# Patient Record
Sex: MALE | Race: WHITE | NOT HISPANIC OR LATINO | Employment: OTHER | URBAN - METROPOLITAN AREA
[De-identification: names, ages, dates, MRNs, and addresses within clinical notes are randomized per-mention and may not be internally consistent; named-entity substitution may affect disease eponyms.]

---

## 2017-05-04 PROBLEM — I50.21 ACUTE SYSTOLIC CONGESTIVE HEART FAILURE: Status: ACTIVE | Noted: 2017-05-04

## 2017-05-15 PROBLEM — Z86.79 H/O CHF: Status: ACTIVE | Noted: 2017-05-15

## 2017-09-06 PROBLEM — I42.0 DILATED CARDIOMYOPATHY: Status: ACTIVE | Noted: 2017-09-06

## 2017-12-01 ENCOUNTER — INITIAL CONSULT (OUTPATIENT)
Dept: RHEUMATOLOGY | Facility: CLINIC | Age: 62
End: 2017-12-01
Payer: COMMERCIAL

## 2017-12-01 VITALS
HEIGHT: 72 IN | BODY MASS INDEX: 38.31 KG/M2 | WEIGHT: 282.81 LBS | DIASTOLIC BLOOD PRESSURE: 92 MMHG | SYSTOLIC BLOOD PRESSURE: 165 MMHG | HEART RATE: 84 BPM

## 2017-12-01 DIAGNOSIS — R53.81 MALAISE AND FATIGUE: ICD-10-CM

## 2017-12-01 DIAGNOSIS — R53.83 MALAISE AND FATIGUE: ICD-10-CM

## 2017-12-01 DIAGNOSIS — E11.29 TYPE 2 DIABETES MELLITUS WITH MICROALBUMINURIA, UNSPECIFIED LONG TERM INSULIN USE STATUS: ICD-10-CM

## 2017-12-01 DIAGNOSIS — Z85.528 HX OF RENAL CELL CARCINOMA: ICD-10-CM

## 2017-12-01 DIAGNOSIS — R80.9 TYPE 2 DIABETES MELLITUS WITH MICROALBUMINURIA, UNSPECIFIED LONG TERM INSULIN USE STATUS: ICD-10-CM

## 2017-12-01 DIAGNOSIS — M05.79 RHEUMATOID ARTHRITIS INVOLVING MULTIPLE SITES WITH POSITIVE RHEUMATOID FACTOR: Primary | ICD-10-CM

## 2017-12-01 PROCEDURE — 99999 PR PBB SHADOW E&M-EST. PATIENT-LVL III: CPT | Mod: PBBFAC,,, | Performed by: INTERNAL MEDICINE

## 2017-12-01 PROCEDURE — 99205 OFFICE O/P NEW HI 60 MIN: CPT | Mod: S$GLB,,, | Performed by: INTERNAL MEDICINE

## 2017-12-01 RX ORDER — HYDROXYCHLOROQUINE SULFATE 200 MG/1
200 TABLET, FILM COATED ORAL 2 TIMES DAILY
Qty: 60 TABLET | Refills: 11 | Status: SHIPPED | OUTPATIENT
Start: 2017-12-01 | End: 2017-12-21

## 2017-12-01 RX ORDER — SPIRONOLACTONE 25 MG/1
25 TABLET ORAL DAILY
COMMUNITY
End: 2017-12-05 | Stop reason: SDUPTHER

## 2017-12-01 RX ORDER — CARVEDILOL 3.12 MG/1
3.12 TABLET ORAL 2 TIMES DAILY WITH MEALS
COMMUNITY
End: 2018-01-08

## 2017-12-01 ASSESSMENT — ROUTINE ASSESSMENT OF PATIENT INDEX DATA (RAPID3)
PSYCHOLOGICAL DISTRESS SCORE: 6.6
PATIENT GLOBAL ASSESSMENT SCORE: 5
MDHAQ FUNCTION SCORE: 1.7
TOTAL RAPID3 SCORE: 6.55
PAIN SCORE: 9

## 2017-12-01 NOTE — PROGRESS NOTES
Subjective:          Chief Complaint: Jorge Mckeon is a 62 y.o. male who had concerns including Rheumatoid Arthritis (self referral).    HPI:    She is a 62-year-old gentleman with seropositive rheumatoid arthritis size bruising on methotrexate I has a history of renal cell carcinoma was elected to start Biologics last rheumatology visit that I can see within the records was in January 2017- non-[facility].  +Rheum nodules.   By record review today patient is on methotrexate 9 tablets weekly with leucovorin 5 mg weekly.  Prednisone 5mg daily did not take b/c concern over BS. Raises blood sugar.   Previously: Enbrel, Orencia, Remicade, Actemra, Simponi, Cimzia and best overall was Humira with longest success. never RTX.   Never LFA  No other malignancies.   Patient follows with Dr. Chandra for pain management.       REVIEW OF SYSTEMS:    Review of Systems   Constitutional: Negative for fever, malaise/fatigue and weight loss.   HENT: Negative for sore throat.    Eyes: Negative for double vision, photophobia and redness.   Respiratory: Negative for cough, shortness of breath and wheezing.    Cardiovascular: Negative for chest pain, palpitations and orthopnea.   Gastrointestinal: Negative for abdominal pain, constipation and diarrhea.   Genitourinary: Negative for dysuria, hematuria and urgency.   Musculoskeletal: Positive for joint pain. Negative for back pain and myalgias.   Skin: Negative for rash.   Neurological: Negative for dizziness, tingling, focal weakness and headaches.   Endo/Heme/Allergies: Does not bruise/bleed easily.   Psychiatric/Behavioral: Negative for depression, hallucinations and suicidal ideas.               Objective:            Past Medical History:   Diagnosis Date    Anticoagulant long-term use     Cancer     renal CA    Coronary artery disease     Diabetes     IDDM    GERD (gastroesophageal reflux disease)     H/O CHF     Hyperlipidemia     stopped cholesterol meds currently    Hypertension      Immunosuppression     Methotrexate for 2 years for RA    Rheumatoid arthritis      Family History   Problem Relation Age of Onset    Cancer Mother     Diabetes Mother     Heart disease Father      Social History   Substance Use Topics    Smoking status: Current Every Day Smoker     Packs/day: 0.50     Years: 40.00     Types: Cigarettes    Smokeless tobacco: Never Used    Alcohol use No         Current Outpatient Prescriptions on File Prior to Visit   Medication Sig Dispense Refill    aspirin (ECOTRIN) 81 MG EC tablet Take 81 mg by mouth once daily.      clopidogrel (PLAVIX) 75 mg tablet Take 1 tablet (75 mg total) by mouth once daily. 90 tablet 3    esomeprazole (NEXIUM) 40 MG capsule TAKE 1 CAPSULE BY MOUTH IN THE MORNING 30 capsule 3    furosemide (LASIX) 20 MG tablet Take 1 tablet (20 mg total) by mouth once daily. 90 tablet 3    insulin aspart protamine-insulin aspart (NOVOLOG MIX 70-30 FLEXPEN) 100 unit/mL (70-30) InPn pen Inject 72 Units into the skin 2 (two) times daily. Needs appt (Patient taking differently: Inject 70 Units into the skin 2 (two) times daily. Needs appt) 45 mL 0    JANUMET 50-1,000 mg per tablet Take 1 tablet by mouth 2 (two) times daily with meals. 60 tablet 3    leucovorin (WELLCOVORIN) 5 mg Tab Take 5 mg by mouth every 7 days.      methotrexate 2.5 MG Tab Take 22.5 mg by mouth Every Friday.       oxycodone-acetaminophen (PERCOCET)  mg per tablet Take 1 tablet by mouth every 6 (six) hours as needed for Pain. 30 tablet 0    pravastatin (PRAVACHOL) 40 MG tablet Take 1 tablet (40 mg total) by mouth once daily. 90 tablet 3    tramadol (ULTRAM-ER) 300 MG Tb24 Take 300 mg by mouth once daily.      valsartan (DIOVAN) 320 MG tablet TAKE 1 TABLET BY MOUTH EVERY DAY 30 tablet 3    hydroxychloroquine (PLAQUENIL) 200 mg tablet Take 200 mg by mouth once daily.  0    ME-TETRAHYDROFOLATE/B12/DXF296 (RHEUMATE ORAL) Take 1 mg by mouth once daily.      VIMOVO 500-20 mg TbID  Take 1 tablet by mouth 2 (two) times daily before meals.  3     No current facility-administered medications on file prior to visit.        Vitals:    12/01/17 1305   BP: (!) 165/92   Pulse: 84       Physical Exam:    Physical Exam   Constitutional: He is oriented to person, place, and time. He appears well-developed and well-nourished.   HENT:   Head: Normocephalic.   Right Ear: Hearing normal.   Left Ear: Hearing normal.   Nose: No rhinorrhea.   Mouth/Throat: Uvula is midline, oropharynx is clear and moist and mucous membranes are normal.   Eyes: Conjunctivae and EOM are normal. Pupils are equal, round, and reactive to light.   Cardiovascular: Normal rate, regular rhythm and normal heart sounds.    Pulmonary/Chest: Effort normal and breath sounds normal.   Musculoskeletal:        Right shoulder: He exhibits normal range of motion, no tenderness and no swelling.        Left shoulder: He exhibits normal range of motion, no tenderness and no swelling.        Right wrist: He exhibits normal range of motion, no tenderness and no swelling.        Left wrist: He exhibits normal range of motion, no tenderness and no swelling.        Right knee: He exhibits normal range of motion and no swelling. No tenderness found.        Left knee: He exhibits normal range of motion and no swelling. No tenderness found.        Right ankle: He exhibits normal range of motion and no swelling. No tenderness.        Left ankle: He exhibits normal range of motion and no swelling. No tenderness.        Right hand: He exhibits tenderness and swelling. He exhibits normal range of motion.        Left hand: He exhibits decreased range of motion and tenderness. He exhibits no swelling.        Right foot: There is normal range of motion, no tenderness and no swelling.        Left foot: There is normal range of motion, no tenderness and no swelling.   ruight rheum nodule on elbow. No flexion contracture. Right 2,3 MCP with swelling.    Neurological:  He is oriented to person, place, and time. He has normal strength.   Skin: Skin is warm, dry and intact.   Psychiatric: He has a normal mood and affect. His speech is normal and behavior is normal. Cognition and memory are normal.             Assessment:       Encounter Diagnoses   Name Primary?    Rheumatoid arthritis involving multiple sites with positive rheumatoid factor Yes    Type 2 diabetes mellitus with microalbuminuria, unspecified long term insulin use status     Hx of renal cell carcinoma     Malaise and fatigue           Plan:        Rheumatoid arthritis involving multiple sites with positive rheumatoid factor  -     X-Ray Hand 3 View Bilateral; Future; Expected date: 12/01/2017    Type 2 diabetes mellitus with microalbuminuria, unspecified long term insulin use status    Hx of renal cell carcinoma  Comments:  left s/p partial     Malaise and fatigue  -     Hepatitis B surface antigen; Future; Expected date: 12/01/2017  -     HBcAB; Future; Expected date: 12/01/2017  -     Hepatitis B surface antibody; Future; Expected date: 12/01/2017  -     Hepatitis C antibody; Future; Expected date: 12/01/2017  -     Quantiferon Gold TB; Future; Expected date: 12/01/2017    Other orders  -     hydroxychloroquine (PLAQUENIL) 200 mg tablet; Take 1 tablet (200 mg total) by mouth 2 (two) times daily.  Dispense: 60 tablet; Refill: 11    Call if HCQ not working  Labs xrays ok at [facility] wife employee.     Return in about 4 months (around 4/1/2018).        60min consultation with greater than 50% spent in counseling, chart review and coordination of care. All questions answered.    Thank you for allowing me to participate in the care of this very pleasant patient.

## 2017-12-08 ENCOUNTER — TELEPHONE (OUTPATIENT)
Dept: RHEUMATOLOGY | Facility: CLINIC | Age: 62
End: 2017-12-08

## 2017-12-08 NOTE — TELEPHONE ENCOUNTER
----- Message from Yamel Bautista DO sent at 12/7/2017  6:04 PM CST -----  Please notify patient that all testing is negative. I have his records that he is still taking Methotrexate please clarify this. My note only refilled HCQ.    LM as above. Call back number is provided for patient to clarify. CG    Wife calls to clarify. Patient has been on 9 tablets of MTX every week for years. It was prescribed by previous rheumatologist and the patient has continued. SIMI

## 2017-12-11 RX ORDER — METHOTREXATE 2.5 MG/1
22.5 TABLET ORAL
Qty: 36 TABLET | Refills: 4 | Status: SHIPPED | OUTPATIENT
Start: 2017-12-11 | End: 2018-04-26 | Stop reason: SDUPTHER

## 2017-12-11 NOTE — TELEPHONE ENCOUNTER
Labs look good I am refilling methotrexate at 9 tabs weekly so this will be listed under my name as well as the HCQ.     Thanks.    Dr. DE JESUS

## 2017-12-12 NOTE — TELEPHONE ENCOUNTER
Patient notified that labs look good and that both MTX and HCQ are now under Dr. Bautista's name. CG

## 2018-04-16 ENCOUNTER — TELEPHONE (OUTPATIENT)
Dept: RHEUMATOLOGY | Facility: CLINIC | Age: 63
End: 2018-04-16

## 2018-04-16 DIAGNOSIS — M05.79 RHEUMATOID ARTHRITIS INVOLVING MULTIPLE SITES WITH POSITIVE RHEUMATOID FACTOR: Primary | ICD-10-CM

## 2018-04-26 ENCOUNTER — OFFICE VISIT (OUTPATIENT)
Dept: RHEUMATOLOGY | Facility: CLINIC | Age: 63
End: 2018-04-26
Payer: COMMERCIAL

## 2018-04-26 VITALS
SYSTOLIC BLOOD PRESSURE: 160 MMHG | BODY MASS INDEX: 37.76 KG/M2 | HEART RATE: 64 BPM | DIASTOLIC BLOOD PRESSURE: 90 MMHG | WEIGHT: 284.88 LBS | HEIGHT: 73 IN

## 2018-04-26 DIAGNOSIS — N28.89 RENAL MASS: ICD-10-CM

## 2018-04-26 DIAGNOSIS — R80.9 TYPE 2 DIABETES MELLITUS WITH MICROALBUMINURIA, UNSPECIFIED WHETHER LONG TERM INSULIN USE: ICD-10-CM

## 2018-04-26 DIAGNOSIS — E11.29 TYPE 2 DIABETES MELLITUS WITH MICROALBUMINURIA, UNSPECIFIED WHETHER LONG TERM INSULIN USE: ICD-10-CM

## 2018-04-26 DIAGNOSIS — M05.79 RHEUMATOID ARTHRITIS INVOLVING MULTIPLE SITES WITH POSITIVE RHEUMATOID FACTOR: Primary | ICD-10-CM

## 2018-04-26 PROCEDURE — 99214 OFFICE O/P EST MOD 30 MIN: CPT | Mod: S$GLB,,, | Performed by: INTERNAL MEDICINE

## 2018-04-26 PROCEDURE — 99999 PR PBB SHADOW E&M-EST. PATIENT-LVL III: CPT | Mod: PBBFAC,,, | Performed by: INTERNAL MEDICINE

## 2018-04-26 RX ORDER — HYDROXYCHLOROQUINE SULFATE 200 MG/1
200 TABLET, FILM COATED ORAL DAILY
Qty: 90 TABLET | Refills: 3 | Status: SHIPPED | OUTPATIENT
Start: 2018-04-26 | End: 2018-08-07 | Stop reason: SDUPTHER

## 2018-04-26 RX ORDER — FOLIC ACID 1 MG/1
1 TABLET ORAL DAILY
Qty: 90 TABLET | Refills: 3 | Status: SHIPPED | OUTPATIENT
Start: 2018-04-26 | End: 2018-08-07 | Stop reason: SDUPTHER

## 2018-04-26 RX ORDER — METHOTREXATE 2.5 MG/1
22.5 TABLET ORAL
Qty: 108 TABLET | Refills: 3 | Status: SHIPPED | OUTPATIENT
Start: 2018-04-27 | End: 2018-08-07 | Stop reason: SDUPTHER

## 2018-04-26 RX ORDER — FOLIC ACID 1 MG/1
1 TABLET ORAL DAILY
COMMUNITY
End: 2018-04-26 | Stop reason: SDUPTHER

## 2018-04-26 NOTE — PROGRESS NOTES
Subjective:          Chief Complaint: Jorge Mckeon is a 63 y.o. male who had concerns including Disease Management.    HPI:    She is a 62-year-old gentleman with seropositive rheumatoid arthritis size bruising on methotrexate    has a history of renal cell carcinoma was elected to start Biologics last rheumatology visit that I can see within the records was in January 2017- non-[facility].  +Rheum nodules.   By record review today patient is on methotrexate 9 tablets weekly with leucovorin 5 mg weekly. And HCQ 200mg   Prednisone 5mg daily did not take b/c concern over BS. Raises blood sugar.   Previously: Enbrel, Orencia, Remicade, Actemra, Simponi, Cimzia and best overall was Humira with longest success. never RTX.   Never LFA  No other malignancies.   Patient follows with Dr. Chandra for pain management.   Last exam with Dr. Nasrin Ashley follows annually.     REVIEW OF SYSTEMS:    Review of Systems   Constitutional: Negative for fever, malaise/fatigue and weight loss.   HENT: Negative for sore throat.    Eyes: Negative for double vision, photophobia and redness.   Respiratory: Negative for cough, shortness of breath and wheezing.    Cardiovascular: Negative for chest pain, palpitations and orthopnea.   Gastrointestinal: Negative for abdominal pain, constipation and diarrhea.   Genitourinary: Negative for dysuria, hematuria and urgency.   Musculoskeletal: Positive for joint pain. Negative for back pain and myalgias.   Skin: Negative for rash.   Neurological: Negative for dizziness, tingling, focal weakness and headaches.   Endo/Heme/Allergies: Does not bruise/bleed easily.   Psychiatric/Behavioral: Negative for depression, hallucinations and suicidal ideas.               Objective:            Past Medical History:   Diagnosis Date    Anticoagulant long-term use     Cancer     renal CA    Coronary artery disease     Diabetes     IDDM    GERD (gastroesophageal reflux disease)     H/O CHF     Hyperlipidemia      stopped cholesterol meds currently    Hypertension     Immunosuppression     Methotrexate for 2 years for RA    Rheumatoid arthritis      Family History   Problem Relation Age of Onset    Cancer Mother     Diabetes Mother     Heart disease Father      Social History   Substance Use Topics    Smoking status: Current Every Day Smoker     Packs/day: 0.50     Years: 40.00     Types: Cigarettes    Smokeless tobacco: Never Used    Alcohol use No         Current Outpatient Prescriptions on File Prior to Visit   Medication Sig Dispense Refill    aspirin (ECOTRIN) 81 MG EC tablet Take 81 mg by mouth once daily.      carvedilol (COREG) 6.25 MG tablet Take 6.25 mg by mouth 2 (two) times daily with meals.      clopidogrel (PLAVIX) 75 mg tablet Take 1 tablet (75 mg total) by mouth once daily. 90 tablet 3    DULoxetine (CYMBALTA) 30 MG capsule Take 1 capsule (30 mg total) by mouth once daily. 30 capsule 3    esomeprazole (NEXIUM) 40 MG capsule TAKE 1 CAPSULE BY MOUTH IN THE MORNING 30 capsule 2    furosemide (LASIX) 20 MG tablet Take 1 tablet (20 mg total) by mouth once daily. 90 tablet 3    hydroxychloroquine (PLAQUENIL) 200 mg tablet Take 200 mg by mouth once daily.      JANUMET 50-1,000 mg per tablet Take 1 tablet by mouth 2 (two) times daily with meals. 60 tablet 3    leucovorin (WELLCOVORIN) 5 mg Tab Take 5 mg by mouth every 7 days.      methotrexate 2.5 MG Tab Take 9 tablets (22.5 mg total) by mouth Every Friday. 36 tablet 4    NOVOLOG MIX 70-30 FLEXPEN 100 unit/mL (70-30) InPn pen inject 70 UNITS SUBCUTANEOUSLY TWICE DAILY 45 mL 3    pravastatin (PRAVACHOL) 40 MG tablet Take 1 tablet (40 mg total) by mouth once daily. 90 tablet 3    spironolactone (ALDACTONE) 25 MG tablet Take 1 tablet (25 mg total) by mouth once daily. 90 tablet 3    tramadol (ULTRAM-ER) 300 MG Tb24 Take 300 mg by mouth once daily.      valsartan (DIOVAN) 320 MG tablet TAKE 1 TABLET BY MOUTH EVERY DAY 30 tablet 2    VIMOVO 500-20  mg TbID Take 1 tablet by mouth 2 (two) times daily before meals.  3     No current facility-administered medications on file prior to visit.        Vitals:    04/26/18 1109   BP: (!) 160/90   Pulse: 64       Physical Exam:    Physical Exam   Constitutional: He is oriented to person, place, and time. He appears well-developed and well-nourished.   HENT:   Head: Normocephalic.   Right Ear: Hearing normal.   Left Ear: Hearing normal.   Nose: No rhinorrhea.   Mouth/Throat: Uvula is midline, oropharynx is clear and moist and mucous membranes are normal.   Eyes: Conjunctivae and EOM are normal. Pupils are equal, round, and reactive to light.   Cardiovascular: Normal rate, regular rhythm and normal heart sounds.    Pulmonary/Chest: Effort normal and breath sounds normal.   Musculoskeletal:        Right shoulder: He exhibits normal range of motion, no tenderness and no swelling.        Left shoulder: He exhibits normal range of motion, no tenderness and no swelling.        Right wrist: He exhibits normal range of motion, no tenderness and no swelling.        Left wrist: He exhibits normal range of motion, no tenderness and no swelling.        Right knee: He exhibits normal range of motion and no swelling. No tenderness found.        Left knee: He exhibits normal range of motion and no swelling. No tenderness found.        Right ankle: He exhibits normal range of motion and no swelling. No tenderness.        Left ankle: He exhibits normal range of motion and no swelling. No tenderness.        Right hand: He exhibits tenderness and swelling. He exhibits normal range of motion.        Left hand: He exhibits decreased range of motion and tenderness. He exhibits no swelling.        Right foot: There is normal range of motion, no tenderness and no swelling.        Left foot: There is normal range of motion, no tenderness and no swelling.   ruight rheum nodule on elbow. No flexion contracture. Right 2,3 MCP with swelling.     Neurological: He is oriented to person, place, and time. He has normal strength.   Skin: Skin is warm, dry and intact.   Psychiatric: He has a normal mood and affect. His speech is normal and behavior is normal. Cognition and memory are normal.             Assessment:       Encounter Diagnoses   Name Primary?    Rheumatoid arthritis involving multiple sites with positive rheumatoid factor Yes    Renal mass     Type 2 diabetes mellitus with microalbuminuria, unspecified whether long term insulin use           Plan:        Rheumatoid arthritis involving multiple sites with positive rheumatoid factor  -     CBC auto differential; Standing; Expected date: 04/26/2018  -     Comprehensive metabolic panel; Standing; Expected date: 04/26/2018  -     Sedimentation rate, manual; Standing; Expected date: 04/26/2018  -     C-reactive protein; Standing; Expected date: 04/26/2018    Renal mass    Type 2 diabetes mellitus with microalbuminuria, unspecified whether long term insulin use    Other orders  -     folic acid (FOLVITE) 1 MG tablet; Take 1 tablet (1 mg total) by mouth once daily.  Dispense: 90 tablet; Refill: 3  -     hydroxychloroquine (PLAQUENIL) 200 mg tablet; Take 1 tablet (200 mg total) by mouth once daily.  Dispense: 90 tablet; Refill: 3  -     methotrexate 2.5 MG Tab; Take 9 tablets (22.5 mg total) by mouth Every Friday.  Dispense: 108 tablet; Refill: 3    Call if HCQ not working  Labs xrays ok at [facility] wife employee.     Follow-up in about 4 months (around 8/26/2018).        30min consultation with greater than 50% spent in counseling, chart review and coordination of care. All questions answered.    Thank you for allowing me to participate in the care of this very pleasant patient.

## 2018-05-24 PROBLEM — K21.9 GERD (GASTROESOPHAGEAL REFLUX DISEASE): Status: ACTIVE | Noted: 2018-05-24

## 2018-05-24 RX ORDER — METHOTREXATE 2.5 MG/1
TABLET ORAL
Qty: 36 TABLET | Refills: 3 | Status: SHIPPED | OUTPATIENT
Start: 2018-05-24 | End: 2018-05-31 | Stop reason: SDUPTHER

## 2018-05-31 PROBLEM — F32.1 CURRENT MODERATE EPISODE OF MAJOR DEPRESSIVE DISORDER WITHOUT PRIOR EPISODE: Status: ACTIVE | Noted: 2018-05-31

## 2018-06-15 PROBLEM — Z86.010 PERSONAL HISTORY OF COLONIC POLYPS: Status: ACTIVE | Noted: 2018-06-15

## 2018-06-15 PROBLEM — Z86.0100 PERSONAL HISTORY OF COLONIC POLYPS: Status: ACTIVE | Noted: 2018-06-15

## 2018-08-07 RX ORDER — METHOTREXATE 2.5 MG/1
22.5 TABLET ORAL
Qty: 108 TABLET | Refills: 1 | Status: SHIPPED | OUTPATIENT
Start: 2018-08-10 | End: 2018-08-07 | Stop reason: SDUPTHER

## 2018-08-07 RX ORDER — FOLIC ACID 1 MG/1
1 TABLET ORAL DAILY
Qty: 90 TABLET | Refills: 3 | Status: SHIPPED | OUTPATIENT
Start: 2018-08-07 | End: 2018-11-09 | Stop reason: SDUPTHER

## 2018-08-07 NOTE — TELEPHONE ENCOUNTER
----- Message from Daiana Ko sent at 8/7/2018  9:15 AM CDT -----  Contact: self  Type:  RX Refill Request    Who Called:  self  Refill or New Rx:  refill  RX Name and Strength:  folic acid (FOLVITE) 1 MG tablet; methotrexate 2.5 MG Tab  How is the patient currently taking it? (ex. 1XDay):  1Xday  Is this a 30 day or 90 day RX:  90  Preferred Pharmacy with phone number:  [facility]  Local or Mail Order:  local  Ordering Provider:  Dr Michele Anthony Call Back Number:  [phone]  Additional Information:      [facility] - - [address] [address]  Phone: [phone] Fax: [phone]

## 2018-09-11 ENCOUNTER — OFFICE VISIT (OUTPATIENT)
Dept: RHEUMATOLOGY | Facility: CLINIC | Age: 63
End: 2018-09-11
Payer: COMMERCIAL

## 2018-09-11 VITALS
HEART RATE: 75 BPM | HEIGHT: 73 IN | SYSTOLIC BLOOD PRESSURE: 152 MMHG | DIASTOLIC BLOOD PRESSURE: 84 MMHG | WEIGHT: 274.94 LBS | BODY MASS INDEX: 36.44 KG/M2

## 2018-09-11 DIAGNOSIS — M05.79 RHEUMATOID ARTHRITIS INVOLVING MULTIPLE SITES WITH POSITIVE RHEUMATOID FACTOR: Primary | ICD-10-CM

## 2018-09-11 DIAGNOSIS — Z79.899 HIGH RISK MEDICATIONS (NOT ANTICOAGULANTS) LONG-TERM USE: ICD-10-CM

## 2018-09-11 PROCEDURE — 99214 OFFICE O/P EST MOD 30 MIN: CPT | Mod: S$GLB,,, | Performed by: INTERNAL MEDICINE

## 2018-09-11 PROCEDURE — 99213 OFFICE O/P EST LOW 20 MIN: CPT | Mod: PBBFAC,PO | Performed by: INTERNAL MEDICINE

## 2018-09-11 PROCEDURE — 99999 PR PBB SHADOW E&M-EST. PATIENT-LVL III: CPT | Mod: PBBFAC,,, | Performed by: INTERNAL MEDICINE

## 2018-09-11 RX ORDER — DULOXETIN HYDROCHLORIDE 30 MG/1
30 CAPSULE, DELAYED RELEASE ORAL DAILY
Refills: 0 | COMMUNITY
Start: 2018-08-14 | End: 2018-11-13 | Stop reason: SDUPTHER

## 2018-09-11 ASSESSMENT — ROUTINE ASSESSMENT OF PATIENT INDEX DATA (RAPID3)
PAIN SCORE: 0
TOTAL RAPID3 SCORE: 1.22
WHEN YOU AWAKENED IN THE MORNING OVER THE LAST WEEK, PLEASE INDICATE THE AMOUNT OF TIME IT TAKES UNTIL YOU ARE AS LIMBER AS YOU WILL BE FOR THE DAY: 2 HOURS
MDHAQ FUNCTION SCORE: .2
PATIENT GLOBAL ASSESSMENT SCORE: 3
AM STIFFNESS SCORE: 1, YES

## 2018-09-11 NOTE — PROGRESS NOTES
Subjective:          Chief Complaint: Jorge Mckeon is a 63 y.o. male who had concerns including Rheumatoid Arthritis (4 month).    HPI:    She is a 62-year-old gentleman with seropositive rheumatoid arthritis size bruising on methotrexate    has a history of renal cell carcinoma was elected to start [facility] last rheumatology visit that I can see within the records was in January 2017- non-Patricksner.  +Rheum nodules.   He is currently on methotrexate 9 tablets weekly with leucovorin 5 mg weekly.   And HCQ 200mg   Prednisone 5mg daily did not take b/c concern over BS. Raises blood sugar.   Previously: Enbrel, Orencia, Remicade, Actemra, Simponi, Cimzia and best overall was Humira with longest success. never RTX.   Never LFA  No other malignancies.   Patient follows with Dr. Chandra for pain management.   Doing well pain 0/10 see rapid 3  Due for labs.     REVIEW OF SYSTEMS:    Review of Systems   Constitutional: Negative for fever, malaise/fatigue and weight loss.   HENT: Negative for sore throat.    Eyes: Negative for double vision, photophobia and redness.   Respiratory: Negative for cough, shortness of breath and wheezing.    Cardiovascular: Negative for chest pain, palpitations and orthopnea.   Gastrointestinal: Negative for abdominal pain, constipation and diarrhea.   Genitourinary: Negative for dysuria, hematuria and urgency.   Musculoskeletal: Positive for joint pain. Negative for back pain and myalgias.   Skin: Negative for rash.   Neurological: Negative for dizziness, tingling, focal weakness and headaches.   Endo/Heme/Allergies: Does not bruise/bleed easily.   Psychiatric/Behavioral: Negative for depression, hallucinations and suicidal ideas.               Objective:            Past Medical History:   Diagnosis Date    Anticoagulant long-term use     Cancer     renal CA    Coronary artery disease     Diabetes     IDDM    GERD (gastroesophageal reflux disease)     H/O CHF     Hyperlipidemia     stopped  cholesterol meds currently    Hypertension     Immunosuppression     Methotrexate for 2 years for RA    Rheumatoid arthritis      Family History   Problem Relation Age of Onset    Cancer Mother     Diabetes Mother     Heart disease Father      Social History     Tobacco Use    Smoking status: Current Every Day Smoker     Packs/day: 0.50     Years: 40.00     Pack years: 20.00     Types: Cigarettes    Smokeless tobacco: Never Used   Substance Use Topics    Alcohol use: No    Drug use: No         Current Outpatient Medications on File Prior to Visit   Medication Sig Dispense Refill    aspirin (ECOTRIN) 81 MG EC tablet Take 81 mg by mouth once daily.      carvedilol (COREG) 6.25 MG tablet Take 1 tablet (6.25 mg total) by mouth 2 (two) times daily with meals. 180 tablet 0    esomeprazole (NEXIUM) 40 MG capsule Take 1 capsule (40 mg total) by mouth every morning. 90 capsule 0    folic acid (FOLVITE) 1 MG tablet Take 1 tablet (1 mg total) by mouth once daily. 90 tablet 3    furosemide (LASIX) 20 MG tablet Take 1 tablet (20 mg total) by mouth once daily. for 360 doses 90 tablet 1    hydroxychloroquine (PLAQUENIL) 200 mg tablet Take 1 tablet (200 mg total) by mouth once daily. 90 tablet 1    insulin aspart protamine-insulin aspart (NOVOLOG MIX 70-30FLEXPEN U-100) 100 unit/mL (70-30) InPn pen Inject 70 Units into the skin 2 (two) times daily. 45 mL 3    losartan (COZAAR) 100 MG tablet Take 1 tablet (100 mg total) by mouth once daily. 90 tablet 0    methotrexate 2.5 MG Tab Take 9 tablets (22.5 mg total) by mouth Every Friday. 108 tablet 1    oxyCODONE-acetaminophen (PERCOCET)  mg per tablet Take 1 tablet by mouth 2 (two) times daily.  0    pravastatin (PRAVACHOL) 40 MG tablet Take 1 tablet (40 mg total) by mouth once daily. 90 tablet 3    SITagliptan-metformin (JANUMET) 50-1,000 mg per tablet Take 1 tablet by mouth 2 (two) times daily with meals. 60 tablet 3    spironolactone (ALDACTONE) 25 MG  tablet Take 1 tablet (25 mg total) by mouth once daily. 90 tablet 0    tramadol (ULTRAM-ER) 300 MG Tb24 Take 300 mg by mouth once daily.      DULoxetine (CYMBALTA) 30 MG capsule Take 30 mg by mouth once daily.  0     No current facility-administered medications on file prior to visit.        Vitals:    09/11/18 0958   BP: (!) 152/84   Pulse: 75       Physical Exam:    Physical Exam   Constitutional: He is oriented to person, place, and time. He appears well-developed and well-nourished.   HENT:   Head: Normocephalic.   Right Ear: Hearing normal.   Left Ear: Hearing normal.   Nose: No rhinorrhea.   Mouth/Throat: Uvula is midline, oropharynx is clear and moist and mucous membranes are normal.   Eyes: Conjunctivae and EOM are normal. Pupils are equal, round, and reactive to light.   Cardiovascular: Normal rate, regular rhythm and normal heart sounds.   Pulmonary/Chest: Effort normal and breath sounds normal.   Musculoskeletal:        Right shoulder: He exhibits normal range of motion, no tenderness and no swelling.        Left shoulder: He exhibits normal range of motion, no tenderness and no swelling.        Right wrist: He exhibits normal range of motion, no tenderness and no swelling.        Left wrist: He exhibits normal range of motion, no tenderness and no swelling.        Right knee: He exhibits normal range of motion and no swelling. No tenderness found.        Left knee: He exhibits normal range of motion and no swelling. No tenderness found.        Right ankle: He exhibits normal range of motion and no swelling. No tenderness.        Left ankle: He exhibits normal range of motion and no swelling. No tenderness.        Right hand: He exhibits tenderness and swelling. He exhibits normal range of motion.        Left hand: He exhibits decreased range of motion and tenderness. He exhibits no swelling.        Right foot: There is normal range of motion, no tenderness and no swelling.        Left foot: There is  normal range of motion, no tenderness and no swelling.   ruight rheum nodule on elbow. No flexion contracture. Right 2,3 MCP with swelling.    Neurological: He is oriented to person, place, and time. He has normal strength.   Skin: Skin is warm, dry and intact.   Psychiatric: He has a normal mood and affect. His speech is normal and behavior is normal. Cognition and memory are normal.             Assessment:       Encounter Diagnoses   Name Primary?    Rheumatoid arthritis involving multiple sites with positive rheumatoid factor Yes    High risk medications (not anticoagulants) long-term use           Plan:        Rheumatoid arthritis involving multiple sites with positive rheumatoid factor  -     Comprehensive metabolic panel; Standing; Expected date: 09/11/2018  -     CBC auto differential; Standing; Expected date: 09/11/2018  -     Sedimentation rate; Standing; Expected date: 09/11/2018  -     C-reactive protein; Standing; Expected date: 09/11/2018    High risk medications (not anticoagulants) long-term use      Continue MTX at 9 tabs weekly  Leucovorin 5mg daily  HCQ 200mg once daily  Labs at [place].   No Follow-up on file.        30min consultation with greater than 50% spent in counseling, chart review and coordination of care. All questions answered.    Thank you for allowing me to participate in the care of this very pleasant patient.

## 2018-11-27 DIAGNOSIS — M05.79 RHEUMATOID ARTHRITIS INVOLVING MULTIPLE SITES WITH POSITIVE RHEUMATOID FACTOR: ICD-10-CM

## 2018-11-27 NOTE — TELEPHONE ENCOUNTER
----- Message from Latrice Zapata sent at 11/27/2018  3:24 PM CST -----  Contact: Julio brian-[phone]  Type: Needs Medical Advice    Who Called:  Julio brian  Best Call Back Number: [phone] (home)   Additional Information: need a refill for hydroxychloroquine (PLAQUENIL) 200 mg tablet and   methotrexate 2.5 MG Tab. Send [facility]. [phone].

## 2018-11-27 NOTE — TELEPHONE ENCOUNTER
----- Message from Latrice Zapata sent at 11/27/2018  3:24 PM CST -----  Contact: Julio brian-[phone]  Type: Needs Medical Advice    Who Called:  Julio brian  Best Call Back Number: [phone] (home)   Additional Information: need a refill for hydroxychloroquine (PLAQUENIL) 200 mg tablet and   methotrexate 2.5 MG Tab. Send [facility] in [facility]. [phone].

## 2018-12-03 RX ORDER — HYDROXYCHLOROQUINE SULFATE 200 MG/1
200 TABLET, FILM COATED ORAL DAILY
Qty: 90 TABLET | Refills: 4 | Status: SHIPPED | OUTPATIENT
Start: 2018-12-03 | End: 2020-12-04 | Stop reason: SDUPTHER

## 2018-12-03 RX ORDER — FOLIC ACID 1 MG/1
1 TABLET ORAL DAILY
Qty: 90 TABLET | Refills: 1 | Status: SHIPPED | OUTPATIENT
Start: 2018-12-03 | End: 2019-05-21 | Stop reason: SDUPTHER

## 2018-12-03 RX ORDER — METHOTREXATE 2.5 MG/1
22.5 TABLET ORAL
Qty: 108 TABLET | Refills: 1 | Status: SHIPPED | OUTPATIENT
Start: 2018-12-07 | End: 2019-02-27 | Stop reason: SDUPTHER

## 2019-01-29 ENCOUNTER — OFFICE VISIT (OUTPATIENT)
Dept: RHEUMATOLOGY | Facility: CLINIC | Age: 64
End: 2019-01-29
Payer: COMMERCIAL

## 2019-01-29 VITALS — WEIGHT: 269.94 LBS | BODY MASS INDEX: 35.78 KG/M2 | HEIGHT: 73 IN

## 2019-01-29 DIAGNOSIS — M05.79 RHEUMATOID ARTHRITIS INVOLVING MULTIPLE SITES WITH POSITIVE RHEUMATOID FACTOR: Primary | ICD-10-CM

## 2019-01-29 PROCEDURE — 99999 PR PBB SHADOW E&M-EST. PATIENT-LVL II: ICD-10-PCS | Mod: PBBFAC,,, | Performed by: INTERNAL MEDICINE

## 2019-01-29 PROCEDURE — 99214 PR OFFICE/OUTPT VISIT, EST, LEVL IV, 30-39 MIN: ICD-10-PCS | Mod: S$GLB,,, | Performed by: INTERNAL MEDICINE

## 2019-01-29 PROCEDURE — 99999 PR PBB SHADOW E&M-EST. PATIENT-LVL II: CPT | Mod: PBBFAC,,, | Performed by: INTERNAL MEDICINE

## 2019-01-29 PROCEDURE — 99214 OFFICE O/P EST MOD 30 MIN: CPT | Mod: S$GLB,,, | Performed by: INTERNAL MEDICINE

## 2019-01-29 NOTE — PROGRESS NOTES
Subjective:          Chief Complaint: Jorge Mckeon is a 63 y.o. male who had concerns including Rheumatoid Arthritis (4 month).    HPI:    She is a 62-year-old gentleman with seropositive rheumatoid arthritis size bruising on methotrexate    has a history of renal cell carcinoma was elected to start [facility]olog[facility] last rheumatology visit that I can see within the records was in January 2017- non-Patricksner.  +Rheum nodules.   He is currently on methotrexate 9 tablets weekly with leucovorin 5 mg weekly.   And HCQ 200mg   Prednisone 5mg daily did not take b/c concern over BS. Raises blood sugar.   Previously: Enbrel, Orencia, Remicade, Actemra, Simponi, Cimzia and best overall was Humira with longest success. never RTX.   Never LFA  No other malignancies.   Patient follows with Dr. Chandra for pain management.   Doing well pain 0/10 see rapid 3  Due for labs.     REVIEW OF SYSTEMS:    Review of Systems   Constitutional: Negative for fever, malaise/fatigue and weight loss.   HENT: Negative for sore throat.    Eyes: Negative for double vision, photophobia and redness.   Respiratory: Negative for cough, shortness of breath and wheezing.    Cardiovascular: Negative for chest pain, palpitations and orthopnea.   Gastrointestinal: Negative for abdominal pain, constipation and diarrhea.   Genitourinary: Negative for dysuria, hematuria and urgency.   Musculoskeletal: Positive for joint pain. Negative for back pain and myalgias.   Skin: Negative for rash.   Neurological: Negative for dizziness, tingling, focal weakness and headaches.   Endo/Heme/Allergies: Does not bruise/bleed easily.   Psychiatric/Behavioral: Negative for depression, hallucinations and suicidal ideas.               Objective:            Past Medical History:   Diagnosis Date    Anticoagulant long-term use     Cancer     renal CA    Coronary artery disease     Diabetes     IDDM    GERD (gastroesophageal reflux disease)     H/O CHF     Hyperlipidemia     stopped  cholesterol meds currently    Hypertension     Immunosuppression     Methotrexate for 2 years for RA    Rheumatoid arthritis      Family History   Problem Relation Age of Onset    Cancer Mother     Diabetes Mother     Heart disease Father      Social History     Tobacco Use    Smoking status: Current Every Day Smoker     Packs/day: 0.50     Years: 40.00     Pack years: 20.00     Types: Cigarettes    Smokeless tobacco: Never Used   Substance Use Topics    Alcohol use: No    Drug use: No         Current Outpatient Medications on File Prior to Visit   Medication Sig Dispense Refill    aspirin (ECOTRIN) 81 MG EC tablet Take 81 mg by mouth once daily.      carvedilol (COREG) 12.5 MG tablet Take 1 tablet (12.5 mg total) by mouth 2 (two) times daily with meals. 180 tablet 3    DULoxetine (CYMBALTA) 30 MG capsule Take 1 capsule (30 mg total) by mouth once daily. 90 capsule 1    folic acid (FOLVITE) 1 MG tablet Take 1 tablet (1 mg total) by mouth once daily. 90 tablet 1    furosemide (LASIX) 20 MG tablet Take 1 tablet (20 mg total) by mouth once daily. for 360 doses 90 tablet 1    hydroxychloroquine (PLAQUENIL) 200 mg tablet Take 1 tablet (200 mg total) by mouth once daily. 90 tablet 4    insulin aspart protamine-insulin aspart (NOVOLOG MIX 70-30FLEXPEN U-100) 100 unit/mL (70-30) InPn pen Inject 52 Units into the skin 2 (two) times daily. 45 mL 3    losartan (COZAAR) 100 MG tablet Take 1 tablet (100 mg total) by mouth once daily. 90 tablet 1    methotrexate 2.5 MG Tab Take 9 tablets (22.5 mg total) by mouth Every Friday. 108 tablet 1    oxyCODONE-acetaminophen (PERCOCET)  mg per tablet Take 1 tablet by mouth 2 (two) times daily.  0    pantoprazole (PROTONIX) 40 MG tablet Take 1 tablet (40 mg total) by mouth once daily. 90 tablet 1    pravastatin (PRAVACHOL) 40 MG tablet Take 1 tablet (40 mg total) by mouth once daily. 90 tablet 3    SITagliptan-metformin (JANUMET) 50-1,000 mg per tablet Take 1  tablet by mouth 2 (two) times daily with meals. 180 tablet 2    spironolactone (ALDACTONE) 25 MG tablet Take 1 tablet (25 mg total) by mouth once daily. 90 tablet 0    tramadol (ULTRAM-ER) 300 MG Tb24 Take 300 mg by mouth once daily.      nitroGLYCERIN (NITROSTAT) 0.3 MG SL tablet Place 1 tablet (0.3 mg total) under the tongue every 5 (five) minutes as needed for Chest pain. 25 tablet 0    [DISCONTINUED] guaifenesin-codeine 100-10 mg/5 ml (TUSSI-ORGANIDIN NR)  mg/5 mL syrup Take 5 mLs by mouth every 6 (six) hours as needed. 150 mL 0     No current facility-administered medications on file prior to visit.        There were no vitals filed for this visit.    Physical Exam:    Physical Exam   Constitutional: He is oriented to person, place, and time. He appears well-developed and well-nourished.   HENT:   Head: Normocephalic.   Right Ear: Hearing normal.   Left Ear: Hearing normal.   Nose: No rhinorrhea.   Mouth/Throat: Uvula is midline, oropharynx is clear and moist and mucous membranes are normal.   Eyes: Conjunctivae and EOM are normal. Pupils are equal, round, and reactive to light.   Cardiovascular: Normal rate, regular rhythm and normal heart sounds.   Pulmonary/Chest: Effort normal and breath sounds normal.   Musculoskeletal:        Right shoulder: He exhibits normal range of motion, no tenderness and no swelling.        Left shoulder: He exhibits normal range of motion, no tenderness and no swelling.        Right wrist: He exhibits normal range of motion, no tenderness and no swelling.        Left wrist: He exhibits normal range of motion, no tenderness and no swelling.        Right knee: He exhibits normal range of motion and no swelling. No tenderness found.        Left knee: He exhibits normal range of motion and no swelling. No tenderness found.        Right ankle: He exhibits normal range of motion and no swelling. No tenderness.        Left ankle: He exhibits normal range of motion and no  swelling. No tenderness.        Right hand: He exhibits tenderness and swelling. He exhibits normal range of motion.        Left hand: He exhibits decreased range of motion and tenderness. He exhibits no swelling.        Right foot: There is normal range of motion, no tenderness and no swelling.        Left foot: There is normal range of motion, no tenderness and no swelling.   ruight rheum nodule on elbow. No flexion contracture. Right 2,3 MCP with swelling.    Neurological: He is oriented to person, place, and time. He has normal strength.   Skin: Skin is warm, dry and intact.   Psychiatric: He has a normal mood and affect. His speech is normal and behavior is normal. Cognition and memory are normal.             Assessment:       Encounter Diagnosis   Name Primary?    Rheumatoid arthritis involving multiple sites with positive rheumatoid factor Yes          Plan:        Rheumatoid arthritis involving multiple sites with positive rheumatoid factor      Continue MTX at 9 tabs weekly  Leucovorin 5mg daily  HCQ 200mg once daily  Labs due this week, has another set pending in March with DR. Betancourt.       Follow-up in about 4 months (around 5/29/2019).        30min consultation with greater than 50% spent in counseling, chart review and coordination of care. All questions answered.    Thank you for allowing me to participate in the care of this very pleasant patient.

## 2019-02-06 PROBLEM — R94.39 ABNORMAL STRESS TEST: Status: ACTIVE | Noted: 2019-02-06

## 2019-02-27 DIAGNOSIS — M05.79 RHEUMATOID ARTHRITIS INVOLVING MULTIPLE SITES WITH POSITIVE RHEUMATOID FACTOR: ICD-10-CM

## 2019-02-27 RX ORDER — METHOTREXATE 2.5 MG/1
22.5 TABLET ORAL
Qty: 108 TABLET | Refills: 1 | Status: SHIPPED | OUTPATIENT
Start: 2019-03-01 | End: 2020-03-03

## 2019-03-15 PROBLEM — E78.5 HYPERLIPIDEMIA: Status: ACTIVE | Noted: 2019-03-15

## 2019-05-22 RX ORDER — FOLIC ACID 1 MG/1
1 TABLET ORAL DAILY
Qty: 90 TABLET | Refills: 1 | Status: SHIPPED | OUTPATIENT
Start: 2019-05-22 | End: 2020-02-20

## 2019-06-12 ENCOUNTER — OFFICE VISIT (OUTPATIENT)
Dept: RHEUMATOLOGY | Facility: CLINIC | Age: 64
End: 2019-06-12
Payer: COMMERCIAL

## 2019-06-12 VITALS
HEIGHT: 73 IN | DIASTOLIC BLOOD PRESSURE: 74 MMHG | HEART RATE: 77 BPM | WEIGHT: 271 LBS | BODY MASS INDEX: 35.92 KG/M2 | SYSTOLIC BLOOD PRESSURE: 125 MMHG

## 2019-06-12 DIAGNOSIS — Z79.899 HIGH RISK MEDICATIONS (NOT ANTICOAGULANTS) LONG-TERM USE: ICD-10-CM

## 2019-06-12 DIAGNOSIS — M05.79 RHEUMATOID ARTHRITIS INVOLVING MULTIPLE SITES WITH POSITIVE RHEUMATOID FACTOR: Primary | ICD-10-CM

## 2019-06-12 PROCEDURE — 99214 PR OFFICE/OUTPT VISIT, EST, LEVL IV, 30-39 MIN: ICD-10-PCS | Mod: S$GLB,,, | Performed by: INTERNAL MEDICINE

## 2019-06-12 PROCEDURE — 99999 PR PBB SHADOW E&M-EST. PATIENT-LVL III: ICD-10-PCS | Mod: PBBFAC,,, | Performed by: INTERNAL MEDICINE

## 2019-06-12 PROCEDURE — 99999 PR PBB SHADOW E&M-EST. PATIENT-LVL III: CPT | Mod: PBBFAC,,, | Performed by: INTERNAL MEDICINE

## 2019-06-12 PROCEDURE — 99214 OFFICE O/P EST MOD 30 MIN: CPT | Mod: S$GLB,,, | Performed by: INTERNAL MEDICINE

## 2019-06-12 RX ORDER — FLUTICASONE PROPIONATE AND SALMETEROL 100; 50 UG/1; UG/1
POWDER RESPIRATORY (INHALATION)
Refills: 2 | COMMUNITY
Start: 2019-03-28 | End: 2019-08-14

## 2019-06-12 ASSESSMENT — ROUTINE ASSESSMENT OF PATIENT INDEX DATA (RAPID3)
MDHAQ FUNCTION SCORE: 2.7
PSYCHOLOGICAL DISTRESS SCORE: 0
PAIN SCORE: 7
TOTAL RAPID3 SCORE: 6.5
PATIENT GLOBAL ASSESSMENT SCORE: 3.5

## 2019-06-12 NOTE — PROGRESS NOTES
Subjective:          Chief Complaint: Jorge Mckeon is a 64 y.o. male who had concerns including Rheumatoid Arthritis.    HPI:    Pt is a 62-year-old gentleman with seropositive rheumatoid arthritis    has a history of renal cell carcinoma  +Rheum nodules.   He is currently on methotrexate 9 tablets weekly with leucovorin 5 mg weekly.   And HCQ 200mg   Prednisone 5mg daily did not take b/c concern over BS. Raises blood sugar.   Previously: Enbrel, Orencia, Remicade, Actemra, Simponi, Cimzia and best overall was Humira with longest success. never RTX.   Never LFA  No other malignancies.   Patient follows with Dr. Chandra for pain management.   Doing well pain 0/10 see rapid 3  Due for labs.     REVIEW OF SYSTEMS:    Review of Systems   Constitutional: Negative for fever, malaise/fatigue and weight loss.   HENT: Negative for sore throat.    Eyes: Negative for double vision, photophobia and redness.   Respiratory: Negative for cough, shortness of breath and wheezing.    Cardiovascular: Negative for chest pain, palpitations and orthopnea.   Gastrointestinal: Negative for abdominal pain, constipation and diarrhea.   Genitourinary: Negative for dysuria, hematuria and urgency.   Musculoskeletal: Positive for joint pain. Negative for back pain and myalgias.   Skin: Negative for rash.   Neurological: Negative for dizziness, tingling, focal weakness and headaches.   Endo/Heme/Allergies: Does not bruise/bleed easily.   Psychiatric/Behavioral: Negative for depression, hallucinations and suicidal ideas.               Objective:            Past Medical History:   Diagnosis Date    Anticoagulant long-term use     Cancer     renal CA    CHF (congestive heart failure)     Coronary artery disease     Diabetes     IDDM    GERD (gastroesophageal reflux disease)     H/O CHF     Hyperlipidemia     stopped cholesterol meds currently    Hypertension     Immunosuppression     Methotrexate for 2 years for RA    Rheumatoid arthritis       Family History   Problem Relation Age of Onset    Cancer Mother     Diabetes Mother     Heart disease Father      Social History     Tobacco Use    Smoking status: Current Every Day Smoker     Packs/day: 0.50     Years: 40.00     Pack years: 20.00     Types: Cigarettes    Smokeless tobacco: Never Used   Substance Use Topics    Alcohol use: No    Drug use: No         Current Outpatient Medications on File Prior to Visit   Medication Sig Dispense Refill    albuterol (PROAIR HFA) 90 mcg/actuation inhaler Inhale 2 puffs into the lungs every 6 (six) hours as needed for Wheezing. Rescue 18 g 0    aspirin (ECOTRIN) 81 MG EC tablet Take 81 mg by mouth once daily.      carvedilol (COREG) 25 MG tablet Take 1 tablet (25 mg total) by mouth 2 (two) times daily with meals. 60 tablet 3    DULoxetine (CYMBALTA) 30 MG capsule Take 1 capsule (30 mg total) by mouth once daily. 90 capsule 1    fluticasone-salmeterol diskus inhaler 100-50 mcg 1 puff bid 60 each 2    fluticasone-salmeterol diskus inhaler 250-50 mcg Inhale 1 puff into the lungs 2 (two) times daily. Controller 60 each 3    folic acid (FOLVITE) 1 MG tablet Take 1 tablet (1 mg total) by mouth once daily. 90 tablet 1    furosemide (LASIX) 20 MG tablet Take 1 tablet (20 mg total) by mouth once daily. for 360 doses 90 tablet 1    hydroxychloroquine (PLAQUENIL) 200 mg tablet Take 1 tablet (200 mg total) by mouth once daily. 90 tablet 4    losartan (COZAAR) 100 MG tablet Take 100 mg by mouth once daily.      methotrexate 2.5 MG Tab Take 9 tablets (22.5 mg total) by mouth Every Friday. 108 tablet 1    nitroGLYCERIN (NITROSTAT) 0.3 MG SL tablet Place 1 tablet (0.3 mg total) under the tongue every 5 (five) minutes as needed for Chest pain. 25 tablet 0    oxyCODONE-acetaminophen (PERCOCET)  mg per tablet Take 1 tablet by mouth 2 (two) times daily.  0    pantoprazole (PROTONIX) 40 MG tablet TAKE 1 TABLET DAILY *THANK YOU* *[place]* 90 tablet 1     pravastatin (PRAVACHOL) 40 MG tablet Take 1 tablet (40 mg total) by mouth once daily. 90 tablet 3    SITagliptan-metformin (JANUMET) 50-1,000 mg per tablet Take 1 tablet by mouth 2 (two) times daily with meals. 180 tablet 2    spironolactone (ALDACTONE) 25 MG tablet Take 1 tablet (25 mg total) by mouth once daily. 90 tablet 0    tramadol (ULTRAM-ER) 300 MG Tb24 Take 300 mg by mouth once daily.      WIXELA INHUB 100-50 mcg/dose diskus inhaler INHALE ONE PUFF INTO THE LUNGS TWICE DAILY THANK YOU GOD BLESS  2    NOVOLOG MIX 70-30FLEXPEN U-100 100 unit/mL (70-30) InPn pen INJECT 70 UNITS SUB CUTANEOUSLY 2 TIMES DAILY 135 mL 2    [DISCONTINUED] methotrexate 2.5 MG Tab TAKE 9 tablets every friday *THANK YOU* *GOD BLESS* 108 tablet 1     No current facility-administered medications on file prior to visit.        Vitals:    06/12/19 1613   BP: 125/74   Pulse: 77       Physical Exam:    Physical Exam   Constitutional: He is oriented to person, place, and time. He appears well-developed and well-nourished.   HENT:   Head: Normocephalic.   Right Ear: Hearing normal.   Left Ear: Hearing normal.   Nose: No rhinorrhea.   Mouth/Throat: Uvula is midline, oropharynx is clear and moist and mucous membranes are normal.   Eyes: Pupils are equal, round, and reactive to light. Conjunctivae and EOM are normal.   Cardiovascular: Normal rate, regular rhythm and normal heart sounds.   Pulmonary/Chest: Effort normal and breath sounds normal.   Musculoskeletal:        Right shoulder: He exhibits normal range of motion, no tenderness and no swelling.        Left shoulder: He exhibits normal range of motion, no tenderness and no swelling.        Right wrist: He exhibits normal range of motion, no tenderness and no swelling.        Left wrist: He exhibits normal range of motion, no tenderness and no swelling.        Right knee: He exhibits normal range of motion and no swelling. No tenderness found.        Left knee: He exhibits normal  range of motion and no swelling. No tenderness found.        Right ankle: He exhibits normal range of motion and no swelling. No tenderness.        Left ankle: He exhibits normal range of motion and no swelling. No tenderness.        Right hand: He exhibits tenderness and swelling. He exhibits normal range of motion.        Left hand: He exhibits decreased range of motion and tenderness. He exhibits no swelling.        Right foot: There is normal range of motion, no tenderness and no swelling.        Left foot: There is normal range of motion, no tenderness and no swelling.   ruight rheum nodule on elbow. No flexion contracture. Right 2,3 MCP with swelling.    Neurological: He is oriented to person, place, and time. He has normal strength.   Skin: Skin is warm, dry and intact.   Psychiatric: He has a normal mood and affect. His speech is normal and behavior is normal. Cognition and memory are normal.             Assessment:       Encounter Diagnoses   Name Primary?    Rheumatoid arthritis involving multiple sites with positive rheumatoid factor Yes    High risk medications (not anticoagulants) long-term use           Plan:        Rheumatoid arthritis involving multiple sites with positive rheumatoid factor  -     CBC auto differential; Standing; Expected date: 06/12/2019  -     Comprehensive metabolic panel; Standing; Expected date: 06/12/2019  -     Sedimentation rate; Standing; Expected date: 06/12/2019  -     C-reactive protein; Standing; Expected date: 06/12/2019  -     CBC auto differential; Standing; Expected date: 06/12/2019  -     Comprehensive metabolic panel; Standing; Expected date: 06/12/2019  -     Sedimentation rate; Standing; Expected date: 06/12/2019  -     C-reactive protein; Standing; Expected date: 06/12/2019    High risk medications (not anticoagulants) long-term use  -     CBC auto differential; Standing; Expected date: 06/12/2019  -     Comprehensive metabolic panel; Standing; Expected date:  06/12/2019  -     Sedimentation rate; Standing; Expected date: 06/12/2019  -     C-reactive protein; Standing; Expected date: 06/12/2019  -     CBC auto differential; Standing; Expected date: 06/12/2019  -     Comprehensive metabolic panel; Standing; Expected date: 06/12/2019  -     Sedimentation rate; Standing; Expected date: 06/12/2019  -     C-reactive protein; Standing; Expected date: 06/12/2019      Continue MTX at 9 tabs weekly  Leucovorin 5mg daily  HCQ 200mg once daily  Labs due this week, has another set pending in March with DR. Betancourt.       Follow up in about 4 months (around 10/12/2019).        30min consultation with greater than 50% spent in counseling, chart review and coordination of care. All questions answered.    Thank you for allowing me to participate in the care of this very pleasant patient.

## 2019-09-18 PROBLEM — I47.29 NONSUSTAINED PAROXYSMAL VENTRICULAR TACHYCARDIA: Status: ACTIVE | Noted: 2019-09-18

## 2019-09-18 PROBLEM — Z95.810 BIVENTRICULAR ICD (IMPLANTABLE CARDIOVERTER-DEFIBRILLATOR) IN PLACE: Status: ACTIVE | Noted: 2019-09-18

## 2019-09-18 PROBLEM — I50.21 ACUTE SYSTOLIC CONGESTIVE HEART FAILURE: Status: RESOLVED | Noted: 2017-05-04 | Resolved: 2019-09-18

## 2019-09-18 PROBLEM — Z95.5 S/P DRUG ELUTING CORONARY STENT PLACEMENT: Status: ACTIVE | Noted: 2019-09-18

## 2019-11-11 ENCOUNTER — OFFICE VISIT (OUTPATIENT)
Dept: RHEUMATOLOGY | Facility: CLINIC | Age: 64
End: 2019-11-11
Payer: COMMERCIAL

## 2019-11-11 VITALS
SYSTOLIC BLOOD PRESSURE: 113 MMHG | DIASTOLIC BLOOD PRESSURE: 74 MMHG | WEIGHT: 281.94 LBS | HEART RATE: 87 BPM | HEIGHT: 73 IN | BODY MASS INDEX: 37.37 KG/M2

## 2019-11-11 DIAGNOSIS — Z79.899 HIGH RISK MEDICATIONS (NOT ANTICOAGULANTS) LONG-TERM USE: ICD-10-CM

## 2019-11-11 DIAGNOSIS — M05.79 RHEUMATOID ARTHRITIS INVOLVING MULTIPLE SITES WITH POSITIVE RHEUMATOID FACTOR: Primary | ICD-10-CM

## 2019-11-11 PROCEDURE — 99214 PR OFFICE/OUTPT VISIT, EST, LEVL IV, 30-39 MIN: ICD-10-PCS | Mod: S$GLB,,, | Performed by: INTERNAL MEDICINE

## 2019-11-11 PROCEDURE — 99999 PR PBB SHADOW E&M-EST. PATIENT-LVL III: CPT | Mod: PBBFAC,,, | Performed by: INTERNAL MEDICINE

## 2019-11-11 PROCEDURE — 99999 PR PBB SHADOW E&M-EST. PATIENT-LVL III: ICD-10-PCS | Mod: PBBFAC,,, | Performed by: INTERNAL MEDICINE

## 2019-11-11 PROCEDURE — 99214 OFFICE O/P EST MOD 30 MIN: CPT | Mod: S$GLB,,, | Performed by: INTERNAL MEDICINE

## 2019-11-11 RX ORDER — FLUTICASONE PROPIONATE AND SALMETEROL 100; 50 UG/1; UG/1
POWDER RESPIRATORY (INHALATION)
Refills: 2 | COMMUNITY
Start: 2019-10-17 | End: 2020-01-13

## 2019-11-11 ASSESSMENT — ROUTINE ASSESSMENT OF PATIENT INDEX DATA (RAPID3)
PATIENT GLOBAL ASSESSMENT SCORE: 6
TOTAL RAPID3 SCORE: 4.72
MDHAQ FUNCTION SCORE: .5
PAIN SCORE: 6.5
PSYCHOLOGICAL DISTRESS SCORE: 2.2

## 2019-11-11 NOTE — PROGRESS NOTES
Subjective:          Chief Complaint: Jorge Mckeon is a 64 y.o. male who had concerns including Rheumatoid Arthritis.    HPI:    Pt is a 62-year-old gentleman with seropositive rheumatoid arthritis    has a history of renal cell carcinoma s/p partial nephrectomy    +Rheum nodules.   He is currently on  methotrexate 9 tablets weekly with leucovorin 5 mg weekly.   And HCQ 200mg  -Dr. Lvei - last recall 2018.   Prednisone 5mg daily did not take b/c concern over BS. Raises blood sugar.   Previously: Enbrel, Orencia, Remicade, Actemra, Simponi, Cimzia and best overall was Humira with longest success. never RTX.   Never LFA  No other malignancies.   Patient follows with Dr. Chandra for pain management.   Doing well pain 3/10 see rapid 3  Due for labs.      Recent bronchitis.       REVIEW OF SYSTEMS:    Review of Systems   Constitutional: Negative for fever, malaise/fatigue and weight loss.   HENT: Negative for sore throat.    Eyes: Negative for double vision, photophobia and redness.   Respiratory: Negative for cough, shortness of breath and wheezing.    Cardiovascular: Negative for chest pain, palpitations and orthopnea.   Gastrointestinal: Negative for abdominal pain, constipation and diarrhea.   Genitourinary: Negative for dysuria, hematuria and urgency.   Musculoskeletal: Positive for joint pain. Negative for back pain and myalgias.   Skin: Negative for rash.   Neurological: Negative for dizziness, tingling, focal weakness and headaches.   Endo/Heme/Allergies: Does not bruise/bleed easily.   Psychiatric/Behavioral: Negative for depression, hallucinations and suicidal ideas.               Objective:            Past Medical History:   Diagnosis Date    Anticoagulant long-term use     Cancer     renal CA    CHF (congestive heart failure)     Coronary artery disease     Diabetes     IDDM    GERD (gastroesophageal reflux disease)     H/O CHF     Hyperlipidemia     stopped cholesterol meds currently    Hypertension      Immunosuppression     Methotrexate for 2 years for RA    Rheumatoid arthritis      Family History   Problem Relation Age of Onset    Cancer Mother     Diabetes Mother     Heart disease Father      Social History     Tobacco Use    Smoking status: Current Every Day Smoker     Packs/day: 0.50     Years: 40.00     Pack years: 20.00     Types: Cigarettes    Smokeless tobacco: Never Used   Substance Use Topics    Alcohol use: No    Drug use: No         Current Outpatient Medications on File Prior to Visit   Medication Sig Dispense Refill    albuterol (PROAIR HFA) 90 mcg/actuation inhaler Inhale 2 puffs into the lungs every 6 (six) hours as needed for Wheezing. Rescue 18 g 0    aspirin (ECOTRIN) 81 MG EC tablet Take 81 mg by mouth once daily.      carvedilol (COREG) 6.25 MG tablet Take 1 tablet (6.25 mg total) by mouth 2 (two) times daily with meals. 60 tablet 11    DULoxetine (CYMBALTA) 30 MG capsule TAKE 1 CAPSULE DAILY *THANK YOU* *GOD BLESS* 90 capsule 1    fenofibrate (TRICOR) 54 MG tablet Take 1 tablet (54 mg total) by mouth once daily. 90 tablet 1    folic acid (FOLVITE) 1 MG tablet Take 1 tablet (1 mg total) by mouth once daily. 90 tablet 1    furosemide (LASIX) 20 MG tablet TAKE 1 TABLET DAILY *THANK YOU* *GOD BLESS* 90 tablet 1    guaifenesin-codeine 100-10 mg/5 ml (TUSSI-ORGANIDIN NR)  mg/5 mL syrup Take 5 mLs by mouth every 6 (six) hours as needed. 150 mL 0    hydroxychloroquine (PLAQUENIL) 200 mg tablet Take 1 tablet (200 mg total) by mouth once daily. 90 tablet 4    insulin aspart protamine-insulin aspart (NOVOLOG MIX 70-30FLEXPEN U-100) 100 unit/mL (70-30) InPn pen Inject 71 Units into the skin 2 (two) times daily. 135 mL 2    JANUMET 50-1,000 mg per tablet Take 1 tablet by mouth 2 (two) times daily with meals. 180 tablet 2    methotrexate 2.5 MG Tab Take 9 tablets (22.5 mg total) by mouth Every Friday. 108 tablet 1    methotrexate 2.5 MG Tab TAKE 9 tablets every friday *THANK  YOU* *GOD BLESS* 108 tablet 1    NOVOLOG MIX 70-30FLEXPEN U-100 100 unit/mL (70-30) InPn pen INJECT 70 UNITS SUB CUTANEOUSLY 2 TIMES DAILY 135 mL 2    oxyCODONE-acetaminophen (PERCOCET)  mg per tablet Take 1 tablet by mouth 2 (two) times daily.  0    pantoprazole (PROTONIX) 40 MG tablet TAKE 1 TABLET DAILY *THANK YOU* *GOD BLESS* 90 tablet 1    sacubitril-valsartan (ENTRESTO)  mg per tablet Take 1 tablet by mouth 2 (two) times daily. 60 tablet 11    spironolactone (ALDACTONE) 25 MG tablet Take 1 tablet (25 mg total) by mouth once daily. 90 tablet 3    spironolactone (ALDACTONE) 25 MG tablet Take 1 tablet (25 mg total) by mouth once daily. 90 tablet 0    tramadol (ULTRAM-ER) 300 MG Tb24 Take 300 mg by mouth once daily.      WIXELA INHUB 100-50 mcg/dose diskus inhaler INHALE 1 PUFF TWICE DAILY AS DIRECTED. THANK YOU GOD ROSALINOSS  2    nitroGLYCERIN (NITROSTAT) 0.3 MG SL tablet Place 1 tablet (0.3 mg total) under the tongue every 5 (five) minutes as needed for Chest pain. (Patient not taking: Reported on 11/11/2019) 25 tablet 0    pravastatin (PRAVACHOL) 40 MG tablet Take 1 tablet (40 mg total) by mouth once daily. 90 tablet 3     No current facility-administered medications on file prior to visit.        Vitals:    11/11/19 0942   BP: 113/74   Pulse: 87       Physical Exam:    Physical Exam   Constitutional: He is oriented to person, place, and time. He appears well-developed and well-nourished.   HENT:   Head: Normocephalic.   Right Ear: Hearing normal.   Left Ear: Hearing normal.   Nose: No rhinorrhea.   Mouth/Throat: Uvula is midline, oropharynx is clear and moist and mucous membranes are normal.   Eyes: Pupils are equal, round, and reactive to light. Conjunctivae and EOM are normal.   Cardiovascular: Normal rate, regular rhythm and normal heart sounds.   Pulmonary/Chest: Effort normal and breath sounds normal.   Musculoskeletal:        Right shoulder: He exhibits normal range of motion, no  tenderness and no swelling.        Left shoulder: He exhibits normal range of motion, no tenderness and no swelling.        Right wrist: He exhibits normal range of motion, no tenderness and no swelling.        Left wrist: He exhibits normal range of motion, no tenderness and no swelling.        Right knee: He exhibits normal range of motion and no swelling. No tenderness found.        Left knee: He exhibits normal range of motion and no swelling. No tenderness found.        Right ankle: He exhibits normal range of motion and no swelling. No tenderness.        Left ankle: He exhibits normal range of motion and no swelling. No tenderness.        Right hand: He exhibits tenderness and swelling. He exhibits normal range of motion.        Left hand: He exhibits decreased range of motion and tenderness. He exhibits no swelling.        Right foot: There is normal range of motion, no tenderness and no swelling.        Left foot: There is normal range of motion, no tenderness and no swelling.   ruight rheum nodule on elbow. No flexion contracture. Right 2,3 MCP with swelling.    Neurological: He is oriented to person, place, and time. He has normal strength.   Skin: Skin is warm, dry and intact.   Psychiatric: He has a normal mood and affect. His speech is normal and behavior is normal. Cognition and memory are normal.             Assessment:       Encounter Diagnoses   Name Primary?    Rheumatoid arthritis involving multiple sites with positive rheumatoid factor Yes    High risk medications (not anticoagulants) long-term use           Plan:        Rheumatoid arthritis involving multiple sites with positive rheumatoid factor  -     CBC auto differential; Standing; Expected date: 11/11/2019  -     Comprehensive metabolic panel; Standing; Expected date: 11/11/2019  -     Sedimentation rate; Standing; Expected date: 11/11/2019  -     C-reactive protein; Standing; Expected date: 11/11/2019  -     CBC auto differential;  Standing; Expected date: 11/11/2019  -     Comprehensive metabolic panel; Standing; Expected date: 11/11/2019  -     Sedimentation rate; Standing; Expected date: 11/11/2019  -     C-reactive protein; Standing; Expected date: 11/11/2019    High risk medications (not anticoagulants) long-term use  -     CBC auto differential; Standing; Expected date: 11/11/2019  -     Comprehensive metabolic panel; Standing; Expected date: 11/11/2019  -     Sedimentation rate; Standing; Expected date: 11/11/2019  -     C-reactive protein; Standing; Expected date: 11/11/2019  -     CBC auto differential; Standing; Expected date: 11/11/2019  -     Comprehensive metabolic panel; Standing; Expected date: 11/11/2019  -     Sedimentation rate; Standing; Expected date: 11/11/2019  -     C-reactive protein; Standing; Expected date: 11/11/2019      Continue MTX at 9 tabs weekly  Leucovorin 5mg daily  HCQ 200mg once daily  Will need eye exam  Labs printed to be done now and in 3 months. At [place].       Follow up in about 4 months (around 3/11/2020).        30min consultation with greater than 50% spent in counseling, chart review and coordination of care. All questions answered.    Thank you for allowing me to participate in the care of this very pleasant patient.

## 2020-03-11 ENCOUNTER — TELEPHONE (OUTPATIENT)
Dept: RHEUMATOLOGY | Facility: CLINIC | Age: 65
End: 2020-03-11

## 2020-03-11 ENCOUNTER — OFFICE VISIT (OUTPATIENT)
Dept: RHEUMATOLOGY | Facility: CLINIC | Age: 65
End: 2020-03-11
Payer: COMMERCIAL

## 2020-03-11 VITALS
BODY MASS INDEX: 38.1 KG/M2 | DIASTOLIC BLOOD PRESSURE: 78 MMHG | HEART RATE: 84 BPM | WEIGHT: 287.5 LBS | SYSTOLIC BLOOD PRESSURE: 129 MMHG | HEIGHT: 73 IN

## 2020-03-11 DIAGNOSIS — D84.9 IMMUNOSUPPRESSION: ICD-10-CM

## 2020-03-11 DIAGNOSIS — M05.79 RHEUMATOID ARTHRITIS INVOLVING MULTIPLE SITES WITH POSITIVE RHEUMATOID FACTOR: Primary | ICD-10-CM

## 2020-03-11 DIAGNOSIS — Z79.899 HIGH RISK MEDICATIONS (NOT ANTICOAGULANTS) LONG-TERM USE: ICD-10-CM

## 2020-03-11 PROCEDURE — 99999 PR PBB SHADOW E&M-EST. PATIENT-LVL III: ICD-10-PCS | Mod: PBBFAC,,, | Performed by: INTERNAL MEDICINE

## 2020-03-11 PROCEDURE — 99999 PR PBB SHADOW E&M-EST. PATIENT-LVL III: CPT | Mod: PBBFAC,,, | Performed by: INTERNAL MEDICINE

## 2020-03-11 PROCEDURE — 99214 PR OFFICE/OUTPT VISIT, EST, LEVL IV, 30-39 MIN: ICD-10-PCS | Mod: S$GLB,,, | Performed by: INTERNAL MEDICINE

## 2020-03-11 PROCEDURE — 99214 OFFICE O/P EST MOD 30 MIN: CPT | Mod: S$GLB,,, | Performed by: INTERNAL MEDICINE

## 2020-03-11 ASSESSMENT — ROUTINE ASSESSMENT OF PATIENT INDEX DATA (RAPID3)
PSYCHOLOGICAL DISTRESS SCORE: 1.1
MDHAQ FUNCTION SCORE: .9
PATIENT GLOBAL ASSESSMENT SCORE: 5
PAIN SCORE: 9
TOTAL RAPID3 SCORE: 5.67

## 2020-03-11 NOTE — TELEPHONE ENCOUNTER
Per Dr. Bautista verbal order: let patient know lab value and advise to contact PCP for further instructions. LVM that BG is 376. CG

## 2020-03-11 NOTE — PROGRESS NOTES
Subjective:          Chief Complaint: Jorge Mckeon is a 64 y.o. male who had concerns including Rheumatoid Arthritis.    HPI:    Pt is a 62-year-old gentleman with seropositive rheumatoid arthritis    has a history of renal cell carcinoma s/p partial nephrectomy    +Rheum nodules.   He is currently on  methotrexate 9 tablets weekly with leucovorin 5 mg weekly.   And HCQ 200mg  -Dr. Levi -2019.   Prednisone 5mg daily did not take b/c concern over BS. Raises blood sugar.   Previously: Enbrel, Orencia, Remicade, Actemra, Simponi, Cimzia and best overall was Humira with longest success. never RTX.   Never LFA  No other malignancies.   Patient follows with Dr. Chandra for pain management. Recent RFA lumbar spine see MRI in [facility]. S/p procedures.     Pain 4-6/10  Due for labs.      REVIEW OF SYSTEMS:    Review of Systems   Constitutional: Negative for fever, malaise/fatigue and weight loss.   HENT: Negative for sore throat.    Eyes: Negative for double vision, photophobia and redness.   Respiratory: Negative for cough, shortness of breath and wheezing.    Cardiovascular: Negative for chest pain, palpitations and orthopnea.   Gastrointestinal: Negative for abdominal pain, constipation and diarrhea.   Genitourinary: Negative for dysuria, hematuria and urgency.   Musculoskeletal: Positive for joint pain. Negative for back pain and myalgias.   Skin: Negative for rash.   Neurological: Negative for dizziness, tingling, focal weakness and headaches.   Endo/Heme/Allergies: Does not bruise/bleed easily.   Psychiatric/Behavioral: Negative for depression, hallucinations and suicidal ideas.               Objective:            Past Medical History:   Diagnosis Date    Anticoagulant long-term use     Cancer     renal CA    CHF (congestive heart failure)     Coronary artery disease     Diabetes     IDDM    GERD (gastroesophageal reflux disease)     H/O CHF     Hyperlipidemia     stopped cholesterol meds currently    Hypertension      Immunosuppression     Methotrexate for 2 years for RA    Rheumatoid arthritis      Family History   Problem Relation Age of Onset    Cancer Mother     Diabetes Mother     Heart disease Father      Social History     Tobacco Use    Smoking status: Current Every Day Smoker     Packs/day: 0.50     Years: 40.00     Pack years: 20.00     Types: Cigarettes    Smokeless tobacco: Never Used   Substance Use Topics    Alcohol use: No    Drug use: No         Current Outpatient Medications on File Prior to Visit   Medication Sig Dispense Refill    aspirin (ECOTRIN) 81 MG EC tablet Take 81 mg by mouth once daily.      carvedilol (COREG) 6.25 MG tablet Take 1 tablet (6.25 mg total) by mouth 2 (two) times daily with meals. 60 tablet 11    DULoxetine (CYMBALTA) 30 MG capsule TAKE ONE CAPSULE BY MOUTH DAILY *THANK YOU* *GOD BLESS* 90 capsule 1    fenofibrate (TRICOR) 54 MG tablet TAKE 1 TABLET DAILY *THANK YOU* *GOD BLESS* 90 tablet 1    folic acid (FOLVITE) 1 MG tablet TAKE 1 TABLET DAILY *THANK YOU* *GOD BLESS* 90 tablet 1    JANUMET 50-1,000 mg per tablet Take 1 tablet by mouth 2 (two) times daily with meals. 180 tablet 2    methotrexate 2.5 MG Tab TAKE 9 tablets every friday *THANK YOU* *GOD BLESS* 108 tablet 1    nitroGLYCERIN (NITROSTAT) 0.3 MG SL tablet Place 1 tablet (0.3 mg total) under the tongue every 5 (five) minutes as needed for Chest pain. 25 tablet 0    oxyCODONE-acetaminophen (PERCOCET)  mg per tablet Take 1 tablet by mouth 2 (two) times daily.  0    pantoprazole (PROTONIX) 40 MG tablet Take 1 tablet (40 mg total) by mouth once daily. 90 tablet 1    pravastatin (PRAVACHOL) 80 MG tablet Take 1 tablet (80 mg total) by mouth once daily. 90 tablet 3    sacubitril-valsartan (ENTRESTO)  mg per tablet Take 1 tablet by mouth 2 (two) times daily. 60 tablet 11    spironolactone (ALDACTONE) 25 MG tablet Take 1 tablet (25 mg total) by mouth once daily. 90 tablet 3    tramadol (ULTRAM-ER)  300 MG Tb24 Take 300 mg by mouth once daily.      WIXELA INHUB 100-50 mcg/dose diskus inhaler INHALE 1 PUFF TWICE DAILY AS DIRECTED. *THANK YOU* *GOD BLESS* 60 each 3    albuterol (PROAIR HFA) 90 mcg/actuation inhaler Inhale 2 puffs into the lungs every 6 (six) hours as needed for Wheezing. Rescue 18 g 0    furosemide (LASIX) 20 MG tablet TAKE 1 TABLET DAILY *THANK YOU* *GOD BLESS* (Patient not taking: Reported on 3/11/2020) 90 tablet 1    hydroxychloroquine (PLAQUENIL) 200 mg tablet Take 1 tablet (200 mg total) by mouth once daily. 90 tablet 4    insulin aspart protamine-insulin aspart (NOVOLOG MIX 70-30FLEXPEN U-100) 100 unit/mL (70-30) InPn pen Inject 72 Units into the skin 2 (two) times daily. 135 mL 2    promethazine (PHENERGAN) 12.5 MG Tab Take 1 tablet (12.5 mg total) by mouth every 6 (six) hours as needed. (Patient not taking: Reported on 2/17/2020) 10 tablet 0     No current facility-administered medications on file prior to visit.        Vitals:    03/11/20 0917   BP: 129/78   Pulse: 84       Physical Exam:    Physical Exam   Constitutional: He is oriented to person, place, and time. He appears well-developed and well-nourished.   HENT:   Head: Normocephalic.   Right Ear: Hearing normal.   Left Ear: Hearing normal.   Nose: No rhinorrhea.   Mouth/Throat: Uvula is midline, oropharynx is clear and moist and mucous membranes are normal.   Eyes: Pupils are equal, round, and reactive to light. Conjunctivae and EOM are normal.   Cardiovascular: Normal rate, regular rhythm and normal heart sounds.   Pulmonary/Chest: Effort normal and breath sounds normal.   Musculoskeletal:        Right shoulder: He exhibits normal range of motion, no tenderness and no swelling.        Left shoulder: He exhibits normal range of motion, no tenderness and no swelling.        Right wrist: He exhibits normal range of motion, no tenderness and no swelling.        Left wrist: He exhibits normal range of motion, no tenderness and  no swelling.        Right knee: He exhibits normal range of motion and no swelling. No tenderness found.        Left knee: He exhibits normal range of motion and no swelling. No tenderness found.        Right ankle: He exhibits normal range of motion and no swelling. No tenderness.        Left ankle: He exhibits normal range of motion and no swelling. No tenderness.        Right hand: He exhibits tenderness and swelling. He exhibits normal range of motion.        Left hand: He exhibits decreased range of motion and tenderness. He exhibits no swelling.        Right foot: There is normal range of motion, no tenderness and no swelling.        Left foot: There is normal range of motion, no tenderness and no swelling.   ruight rheum nodule on elbow. No flexion contracture. Right 2,3 MCP with swelling.    Neurological: He is oriented to person, place, and time. He has normal strength.   Skin: Skin is warm, dry and intact.   Psychiatric: He has a normal mood and affect. His speech is normal and behavior is normal. Cognition and memory are normal.             Assessment:       Encounter Diagnoses   Name Primary?    Rheumatoid arthritis involving multiple sites with positive rheumatoid factor Yes    High risk medications (not anticoagulants) long-term use     Immunosuppression           Plan:        Rheumatoid arthritis involving multiple sites with positive rheumatoid factor  -     CBC auto differential; Standing; Expected date: 03/11/2020  -     Comprehensive metabolic panel; Standing; Expected date: 03/11/2020  -     Sedimentation rate; Standing; Expected date: 03/11/2020  -     C-reactive protein; Standing; Expected date: 03/11/2020    High risk medications (not anticoagulants) long-term use    Immunosuppression  -     CBC auto differential; Standing; Expected date: 03/11/2020  -     Comprehensive metabolic panel; Standing; Expected date: 03/11/2020  -     Sedimentation rate; Standing; Expected date: 03/11/2020  -      C-reactive protein; Standing; Expected date: 03/11/2020      Continue MTX at 9 tabs weekly  Leucovorin 5mg daily  HCQ 200mg once daily  Will need eye exam  Labs printed to be done now and in 3 months. At [place].       Follow up in about 4 months (around 7/11/2020).        30min consultation with greater than 50% spent in counseling, chart review and coordination of care. All questions answered.    Thank you for allowing me to participate in the care of this very pleasant patient.

## 2020-07-23 ENCOUNTER — OFFICE VISIT (OUTPATIENT)
Dept: RHEUMATOLOGY | Facility: CLINIC | Age: 65
End: 2020-07-23
Payer: COMMERCIAL

## 2020-07-23 VITALS
BODY MASS INDEX: 38.52 KG/M2 | SYSTOLIC BLOOD PRESSURE: 132 MMHG | HEIGHT: 73 IN | DIASTOLIC BLOOD PRESSURE: 86 MMHG | WEIGHT: 290.69 LBS | HEART RATE: 86 BPM

## 2020-07-23 DIAGNOSIS — M05.79 RHEUMATOID ARTHRITIS INVOLVING MULTIPLE SITES WITH POSITIVE RHEUMATOID FACTOR: Primary | ICD-10-CM

## 2020-07-23 DIAGNOSIS — D84.9 IMMUNOSUPPRESSION: ICD-10-CM

## 2020-07-23 DIAGNOSIS — Z79.899 HIGH RISK MEDICATIONS (NOT ANTICOAGULANTS) LONG-TERM USE: ICD-10-CM

## 2020-07-23 PROCEDURE — 99999 PR PBB SHADOW E&M-EST. PATIENT-LVL V: ICD-10-PCS | Mod: PBBFAC,,, | Performed by: INTERNAL MEDICINE

## 2020-07-23 PROCEDURE — 99999 PR PBB SHADOW E&M-EST. PATIENT-LVL V: CPT | Mod: PBBFAC,,, | Performed by: INTERNAL MEDICINE

## 2020-07-23 PROCEDURE — 99214 PR OFFICE/OUTPT VISIT, EST, LEVL IV, 30-39 MIN: ICD-10-PCS | Mod: S$GLB,,, | Performed by: INTERNAL MEDICINE

## 2020-07-23 PROCEDURE — 99214 OFFICE O/P EST MOD 30 MIN: CPT | Mod: S$GLB,,, | Performed by: INTERNAL MEDICINE

## 2020-07-23 ASSESSMENT — ROUTINE ASSESSMENT OF PATIENT INDEX DATA (RAPID3)
FATIGUE SCORE: 1.1
PATIENT GLOBAL ASSESSMENT SCORE: 5.5
TOTAL RAPID3 SCORE: 3.67
PAIN SCORE: 4.5
PSYCHOLOGICAL DISTRESS SCORE: 2.2
MDHAQ FUNCTION SCORE: 0.3

## 2020-07-23 NOTE — PROGRESS NOTES
Subjective:          Chief Complaint: Jorge Mckeon is a 65 y.o. male who had concerns including Disease Management.    HPI:    Pt is a 62-year-old gentleman with seropositive rheumatoid arthritis    has a history of renal cell carcinoma s/p partial nephrectomy    +Rheum nodules.   He is currently on  methotrexate 9 tablets weekly with leucovorin 5 mg weekly.   And HCQ 200mg  -Dr. Levi -2019.   Prednisone 5mg daily did not take b/c concern over BS. Raises blood sugar.   Previously: Enbrel, Orencia, Remicade, Actemra, Simponi, Cimzia and best overall was Humira with longest success. never RTX.   Never LFA  No other malignancies.   Patient follows with Dr. Chandra for pain management. Recent RFA lumbar spine see MRI in [facility]. S/p procedures.     Pain 4-6/10  Due for labs.      REVIEW OF SYSTEMS:    Review of Systems   Constitutional: Negative for fever, malaise/fatigue and weight loss.   HENT: Negative for sore throat.    Eyes: Negative for double vision, photophobia and redness.   Respiratory: Negative for cough, shortness of breath and wheezing.    Cardiovascular: Negative for chest pain, palpitations and orthopnea.   Gastrointestinal: Negative for abdominal pain, constipation and diarrhea.   Genitourinary: Negative for dysuria, hematuria and urgency.   Musculoskeletal: Positive for joint pain. Negative for back pain and myalgias.   Skin: Negative for rash.   Neurological: Negative for dizziness, tingling, focal weakness and headaches.   Endo/Heme/Allergies: Does not bruise/bleed easily.   Psychiatric/Behavioral: Negative for depression, hallucinations and suicidal ideas.               Objective:            Past Medical History:   Diagnosis Date    Anticoagulant long-term use     Cancer     renal CA    CHF (congestive heart failure)     Coronary artery disease     Diabetes     IDDM    GERD (gastroesophageal reflux disease)     H/O CHF     Hyperlipidemia     stopped cholesterol meds currently    Hypertension      Immunosuppression     Methotrexate for 2 years for RA    Rheumatoid arthritis      Family History   Problem Relation Age of Onset    Cancer Mother     Diabetes Mother     Heart disease Father      Social History     Tobacco Use    Smoking status: Current Every Day Smoker     Packs/day: 0.50     Years: 40.00     Pack years: 20.00     Types: Cigarettes    Smokeless tobacco: Never Used   Substance Use Topics    Alcohol use: No    Drug use: No         Current Outpatient Medications on File Prior to Visit   Medication Sig Dispense Refill    albuterol (PROAIR HFA) 90 mcg/actuation inhaler Inhale 2 puffs into the lungs every 6 (six) hours as needed for Wheezing. Rescue 18 g 0    aspirin (ECOTRIN) 81 MG EC tablet Take 81 mg by mouth once daily.      carvediloL (COREG) 6.25 MG tablet Take 1 tablet (6.25 mg total) by mouth 2 (two) times daily. 60 tablet 11    DULoxetine (CYMBALTA) 30 MG capsule TAKE ONE CAPSULE BY MOUTH DAILY *THANK YOU* *GOD BLESS* 90 capsule 1    fenofibrate (TRICOR) 54 MG tablet Take 1 tablet (54 mg total) by mouth once daily. 90 tablet 3    folic acid (FOLVITE) 1 MG tablet TAKE 1 TABLET BY MOUTH DAILY *THANK YOU* *GOD BLESS* 90 tablet 1    furosemide (LASIX) 20 MG tablet Take 1 tablet (20 mg total) by mouth once daily. 90 tablet 3    hydroxychloroquine (PLAQUENIL) 200 mg tablet Take 1 tablet (200 mg total) by mouth once daily. 90 tablet 4    insulin aspart protamine-insulin aspart (NOVOLOG MIX 70-30FLEXPEN U-100) 100 unit/mL (70-30) InPn pen Inject 72 Units into the skin 2 (two) times daily. 135 mL 2    JANUMET 50-1,000 mg per tablet Take 1 tablet by mouth 2 (two) times daily with meals. 180 tablet 2    methotrexate 2.5 MG Tab TAKE 9 tablets every friday *THANK YOU* *GOD BLESS* 108 tablet 1    nitroGLYCERIN (NITROSTAT) 0.3 MG SL tablet Place 1 tablet (0.3 mg total) under the tongue every 5 (five) minutes as needed for Chest pain. 25 tablet 0    oxyCODONE-acetaminophen (PERCOCET)   mg per tablet Take 1 tablet by mouth 2 (two) times daily.  0    pantoprazole (PROTONIX) 40 MG tablet TAKE 1 TABLET BY MOUTH DAILY *THANK YOU* *GOD BLESS* 90 tablet 1    pravastatin (PRAVACHOL) 80 MG tablet Take 1 tablet (80 mg total) by mouth once daily. 90 tablet 3    promethazine (PHENERGAN) 12.5 MG Tab Take 1 tablet (12.5 mg total) by mouth every 6 (six) hours as needed. 10 tablet 0    sacubitriL-valsartan (ENTRESTO)  mg per tablet Take 1 tablet by mouth 2 (two) times daily. 60 tablet 11    spironolactone (ALDACTONE) 25 MG tablet Take 1 tablet (25 mg total) by mouth once daily. 90 tablet 3    tramadol (ULTRAM-ER) 300 MG Tb24 Take 300 mg by mouth once daily.      WIXELA INHUB 100-50 mcg/dose diskus inhaler INHALE 1 PUFF TWICE DAILY AS DIRECTED. *THANK YOU* *GOD BLESS* 60 each 3     No current facility-administered medications on file prior to visit.        Vitals:    07/23/20 0837   BP: 132/86   Pulse: 86       Physical Exam:    Physical Exam  Constitutional:       Appearance: He is well-developed.   HENT:      Head: Normocephalic.      Right Ear: Hearing normal.      Left Ear: Hearing normal.      Nose: No rhinorrhea.      Mouth/Throat:      Pharynx: Uvula midline.   Eyes:      Conjunctiva/sclera: Conjunctivae normal.      Pupils: Pupils are equal, round, and reactive to light.   Cardiovascular:      Rate and Rhythm: Normal rate and regular rhythm.      Heart sounds: Normal heart sounds.   Pulmonary:      Effort: Pulmonary effort is normal.      Breath sounds: Normal breath sounds.   Musculoskeletal:      Right shoulder: He exhibits normal range of motion, no tenderness and no swelling.      Left shoulder: He exhibits normal range of motion, no tenderness and no swelling.      Right wrist: He exhibits normal range of motion, no tenderness and no swelling.      Left wrist: He exhibits normal range of motion, no tenderness and no swelling.      Right knee: He exhibits normal range of motion and  no swelling. No tenderness found.      Left knee: He exhibits normal range of motion and no swelling. No tenderness found.      Right ankle: He exhibits normal range of motion and no swelling. No tenderness.      Left ankle: He exhibits normal range of motion and no swelling. No tenderness.      Right hand: He exhibits tenderness and swelling. He exhibits normal range of motion.      Left hand: He exhibits decreased range of motion and tenderness. He exhibits no swelling.      Right foot: Normal range of motion. No tenderness or swelling.      Left foot: Normal range of motion. No tenderness or swelling.      Comments: ruight rheum nodule on elbow. No flexion contracture. Right 2,3 MCP with swelling.    Skin:     General: Skin is warm and dry.   Neurological:      Mental Status: He is oriented to person, place, and time.   Psychiatric:         Speech: Speech normal.         Behavior: Behavior normal.               Assessment:       Encounter Diagnoses   Name Primary?    Rheumatoid arthritis involving multiple sites with positive rheumatoid factor Yes    Immunosuppression     High risk medications (not anticoagulants) long-term use           Plan:        Rheumatoid arthritis involving multiple sites with positive rheumatoid factor  -     CBC auto differential; Standing; Expected date: 07/23/2020  -     Comprehensive metabolic panel; Standing; Expected date: 07/23/2020  -     Sedimentation rate; Standing; Expected date: 07/23/2020  -     C-Reactive Protein; Standing; Expected date: 07/23/2020    Immunosuppression  -     CBC auto differential; Standing; Expected date: 07/23/2020  -     Comprehensive metabolic panel; Standing; Expected date: 07/23/2020  -     Sedimentation rate; Standing; Expected date: 07/23/2020  -     C-Reactive Protein; Standing; Expected date: 07/23/2020    High risk medications (not anticoagulants) long-term use  -     CBC auto differential; Standing; Expected date: 07/23/2020  -      Comprehensive metabolic panel; Standing; Expected date: 07/23/2020  -     Sedimentation rate; Standing; Expected date: 07/23/2020  -     C-Reactive Protein; Standing; Expected date: 07/23/2020      Continue MTX at 9 tabs weekly  Now on folic acid 1mg daily.   HCQ 200mg once daily  Will need eye exam  Labs printed to be done now and in 3 months. At [place].       No follow-ups on file.        30min consultation with greater than 50% spent in counseling, chart review and coordination of care. All questions answered.    Thank you for allowing me to participate in the care of this very pleasant patient.

## 2020-11-27 ENCOUNTER — TELEPHONE (OUTPATIENT)
Dept: RHEUMATOLOGY | Facility: CLINIC | Age: 65
End: 2020-11-27

## 2020-11-30 ENCOUNTER — TELEPHONE (OUTPATIENT)
Dept: RHEUMATOLOGY | Facility: CLINIC | Age: 65
End: 2020-11-30

## 2020-11-30 NOTE — TELEPHONE ENCOUNTER
Spoke with patient and let them know that  was out of office today but would be able to see them Friday, pt verbalized understanding

## 2020-12-04 ENCOUNTER — OFFICE VISIT (OUTPATIENT)
Dept: RHEUMATOLOGY | Facility: CLINIC | Age: 65
End: 2020-12-04
Payer: COMMERCIAL

## 2020-12-04 VITALS
HEART RATE: 89 BPM | BODY MASS INDEX: 38.45 KG/M2 | HEIGHT: 73 IN | DIASTOLIC BLOOD PRESSURE: 81 MMHG | SYSTOLIC BLOOD PRESSURE: 157 MMHG | WEIGHT: 290.13 LBS

## 2020-12-04 DIAGNOSIS — M05.79 RHEUMATOID ARTHRITIS INVOLVING MULTIPLE SITES WITH POSITIVE RHEUMATOID FACTOR: Primary | ICD-10-CM

## 2020-12-04 DIAGNOSIS — Z79.899 HIGH RISK MEDICATIONS (NOT ANTICOAGULANTS) LONG-TERM USE: ICD-10-CM

## 2020-12-04 DIAGNOSIS — M47.27 OSTEOARTHRITIS OF SPINE WITH RADICULOPATHY, LUMBOSACRAL REGION: ICD-10-CM

## 2020-12-04 DIAGNOSIS — Z95.1 HISTORY OF CORONARY ARTERY BYPASS SURGERY: ICD-10-CM

## 2020-12-04 PROCEDURE — 99214 OFFICE O/P EST MOD 30 MIN: CPT | Mod: S$GLB,,, | Performed by: INTERNAL MEDICINE

## 2020-12-04 PROCEDURE — 99999 PR PBB SHADOW E&M-EST. PATIENT-LVL IV: CPT | Mod: PBBFAC,,, | Performed by: INTERNAL MEDICINE

## 2020-12-04 PROCEDURE — 99999 PR PBB SHADOW E&M-EST. PATIENT-LVL IV: ICD-10-PCS | Mod: PBBFAC,,, | Performed by: INTERNAL MEDICINE

## 2020-12-04 PROCEDURE — 99214 PR OFFICE/OUTPT VISIT, EST, LEVL IV, 30-39 MIN: ICD-10-PCS | Mod: S$GLB,,, | Performed by: INTERNAL MEDICINE

## 2020-12-04 PROCEDURE — 99214 OFFICE O/P EST MOD 30 MIN: CPT | Mod: PBBFAC,PO | Performed by: INTERNAL MEDICINE

## 2020-12-04 RX ORDER — FOLIC ACID 1 MG/1
TABLET ORAL
Qty: 90 TABLET | Refills: 1 | Status: SHIPPED | OUTPATIENT
Start: 2020-12-04 | End: 2021-04-27 | Stop reason: SDUPTHER

## 2020-12-04 RX ORDER — METHOTREXATE 2.5 MG/1
TABLET ORAL
Qty: 108 TABLET | Refills: 1 | Status: SHIPPED | OUTPATIENT
Start: 2020-12-04 | End: 2021-02-04

## 2020-12-04 RX ORDER — HYDROXYCHLOROQUINE SULFATE 200 MG/1
200 TABLET, FILM COATED ORAL DAILY
Qty: 90 TABLET | Refills: 4 | Status: SHIPPED | OUTPATIENT
Start: 2020-12-04 | End: 2021-12-01 | Stop reason: SDUPTHER

## 2020-12-04 ASSESSMENT — ROUTINE ASSESSMENT OF PATIENT INDEX DATA (RAPID3)
FATIGUE SCORE: 1.1
MDHAQ FUNCTION SCORE: 0.8
PAIN SCORE: 6
TOTAL RAPID3 SCORE: 4.72
PSYCHOLOGICAL DISTRESS SCORE: 2.2
PATIENT GLOBAL ASSESSMENT SCORE: 5.5

## 2020-12-04 NOTE — PROGRESS NOTES
Subjective:          Chief Complaint: Jorge Mckeon is a 65 y.o. male who had concerns including Disease Management.    HPI:    Pt is a 62-year-old gentleman with seropositive rheumatoid arthritis    has a history of renal cell carcinoma s/p partial nephrectomy    +Rheum nodules.   He is currently on  methotrexate 9 tablets weekly with leucovorin 5 mg weekly.   And HCQ 200mg  -Dr. Levi -2019.   Prednisone 5mg daily did not take b/c concern over BS. Raises blood sugar.   Previously: Enbrel, Orencia, Remicade, Actemra, Simponi, Cimzia and best overall was Humira with longest success. never RTX.   Never LFA  No other malignancies.   Patient follows with Dr. Chandra for pain management. Recent RFA lumbar spine see MRI in [facility]. S/p procedures.     Pain 4-6/10  Due for labs.      REVIEW OF SYSTEMS:    Review of Systems   Constitutional: Negative for fever, malaise/fatigue and weight loss.   HENT: Negative for sore throat.    Eyes: Negative for double vision, photophobia and redness.   Respiratory: Negative for cough, shortness of breath and wheezing.    Cardiovascular: Negative for chest pain, palpitations and orthopnea.   Gastrointestinal: Negative for abdominal pain, constipation and diarrhea.   Genitourinary: Negative for dysuria, hematuria and urgency.   Musculoskeletal: Positive for joint pain. Negative for back pain and myalgias.   Skin: Negative for rash.   Neurological: Negative for dizziness, tingling, focal weakness and headaches.   Endo/Heme/Allergies: Does not bruise/bleed easily.   Psychiatric/Behavioral: Negative for depression, hallucinations and suicidal ideas.               Objective:            Past Medical History:   Diagnosis Date    Anticoagulant long-term use     Cancer     renal CA    CHF (congestive heart failure)     Coronary artery disease     Diabetes     IDDM    GERD (gastroesophageal reflux disease)     H/O CHF     Hyperlipidemia     stopped cholesterol meds currently    Hypertension      Immunosuppression     Methotrexate for 2 years for RA    Rheumatoid arthritis      Family History   Problem Relation Age of Onset    Cancer Mother     Diabetes Mother     Heart disease Father      Social History     Tobacco Use    Smoking status: Current Every Day Smoker     Packs/day: 0.50     Years: 40.00     Pack years: 20.00     Types: Cigarettes    Smokeless tobacco: Never Used   Substance Use Topics    Alcohol use: No    Drug use: No         Current Outpatient Medications on File Prior to Visit   Medication Sig Dispense Refill    albuterol (PROAIR HFA) 90 mcg/actuation inhaler Inhale 2 puffs into the lungs every 6 (six) hours as needed for Wheezing. Rescue 18 g 0    aspirin (ECOTRIN) 81 MG EC tablet Take 81 mg by mouth once daily.      carvediloL (COREG) 6.25 MG tablet Take 1 tablet (6.25 mg total) by mouth 2 (two) times daily. 60 tablet 11    DULoxetine (CYMBALTA) 60 MG capsule Take 1 capsule (60 mg total) by mouth once daily. 30 capsule 3    fenofibrate (TRICOR) 54 MG tablet TAKE ONE TABLET BY MOUTH DAILY *THANK YOU* *GOD BLESS* 90 tablet 1    fluticasone-salmeterol diskus inhaler 100-50 mcg INHALE ONE PUFF BY MOUTH TWICE DAILY AS DIRECTED. *THANK YOU* *GOD BLESS* 180 each 2    folic acid (FOLVITE) 1 MG tablet TAKE 1 TABLET BY MOUTH DAILY *THANK YOU* *GOD BLESS* 90 tablet 1    furosemide (LASIX) 20 MG tablet Take 1 tablet (20 mg total) by mouth once daily. 90 tablet 3    hydroxychloroquine (PLAQUENIL) 200 mg tablet Take 1 tablet (200 mg total) by mouth once daily. 90 tablet 4    insulin aspart protamine-insulin aspart (NOVOLOG MIX 70-30FLEXPEN U-100) 100 unit/mL (70-30) InPn pen Inject 72 Units into the skin 2 (two) times daily. 135 mL 2    methotrexate 2.5 MG Tab TAKE 9 TABLETS BY MOUTH EVERY FRIDAY *THANK YOU* *GOD BLESS* 108 tablet 1    nitroGLYCERIN (NITROSTAT) 0.3 MG SL tablet Place 1 tablet (0.3 mg total) under the tongue every 5 (five) minutes as needed for Chest pain. 25  tablet 0    oxyCODONE-acetaminophen (PERCOCET)  mg per tablet Take 1 tablet by mouth 2 (two) times daily.  0    pantoprazole (PROTONIX) 40 MG tablet TAKE 1 TABLET BY MOUTH DAILY *THANK YOU* *GOD BLESS* 90 tablet 1    pravastatin (PRAVACHOL) 80 MG tablet Take 1 tablet (80 mg total) by mouth once daily. 90 tablet 3    promethazine (PHENERGAN) 12.5 MG Tab Take 1 tablet (12.5 mg total) by mouth every 6 (six) hours as needed. 10 tablet 0    sacubitriL-valsartan (ENTRESTO)  mg per tablet Take 1 tablet by mouth 2 (two) times daily. 60 tablet 11    sildenafiL (VIAGRA) 100 MG tablet Take 1 tablet (100 mg total) by mouth daily as needed. 6 tablet 2    SITagliptan-metformin (JANUMET) 50-1,000 mg per tablet Take 1 tablet by mouth 2 (two) times daily with meals. 180 tablet 2    spironolactone (ALDACTONE) 25 MG tablet Take 1 tablet (25 mg total) by mouth once daily. 90 tablet 3    tramadol (ULTRAM-ER) 300 MG Tb24 Take 300 mg by mouth once daily.       No current facility-administered medications on file prior to visit.        Vitals:    12/04/20 0835   BP: (!) 157/81   Pulse: 89       Physical Exam:    Physical Exam  Constitutional:       Appearance: He is well-developed.   HENT:      Head: Normocephalic.      Right Ear: Hearing normal.      Left Ear: Hearing normal.      Nose: No rhinorrhea.      Mouth/Throat:      Pharynx: Uvula midline.   Eyes:      Conjunctiva/sclera: Conjunctivae normal.      Pupils: Pupils are equal, round, and reactive to light.   Cardiovascular:      Rate and Rhythm: Normal rate and regular rhythm.      Heart sounds: Normal heart sounds.   Pulmonary:      Effort: Pulmonary effort is normal.      Breath sounds: Normal breath sounds.   Musculoskeletal:      Right shoulder: He exhibits normal range of motion, no tenderness and no swelling.      Left shoulder: He exhibits normal range of motion, no tenderness and no swelling.      Right wrist: He exhibits normal range of motion, no  tenderness and no swelling.      Left wrist: He exhibits normal range of motion, no tenderness and no swelling.      Right knee: He exhibits normal range of motion and no swelling. No tenderness found.      Left knee: He exhibits normal range of motion and no swelling. No tenderness found.      Right ankle: He exhibits normal range of motion and no swelling. No tenderness.      Left ankle: He exhibits normal range of motion and no swelling. No tenderness.      Right hand: He exhibits tenderness and swelling. He exhibits normal range of motion.      Left hand: He exhibits decreased range of motion and tenderness. He exhibits no swelling.      Right foot: Normal range of motion. No tenderness or swelling.      Left foot: Normal range of motion. No tenderness or swelling.      Comments: ruight rheum nodule on elbow. No flexion contracture. Right 2,3 MCP with swelling.    Skin:     General: Skin is warm and dry.   Neurological:      Mental Status: He is oriented to person, place, and time.   Psychiatric:         Speech: Speech normal.         Behavior: Behavior normal.               Assessment:       Encounter Diagnoses   Name Primary?    Rheumatoid arthritis involving multiple sites with positive rheumatoid factor Yes    Osteoarthritis of spine with radiculopathy, lumbosacral region     History of coronary artery bypass surgery     High risk medications (not anticoagulants) long-term use           Plan:        Rheumatoid arthritis involving multiple sites with positive rheumatoid factor  -     CBC Auto Differential; Standing; Expected date: 12/04/2020  -     Comprehensive Metabolic Panel; Standing; Expected date: 12/04/2020  -     Sedimentation rate; Standing; Expected date: 12/04/2020  -     C-Reactive Protein; Standing; Expected date: 12/04/2020  -     methotrexate 2.5 MG Tab; TAKE 9 TABLETS BY MOUTH EVERY FRIDAY *THANK YOU* *SUN DE LA VEGA*  Dispense: 108 tablet; Refill: 1  -     folic acid (FOLVITE) 1 MG tablet;  TAKE 1 TABLET BY MOUTH DAILY *THANK YOU* *SUN DE LA VEGA*  Dispense: 90 tablet; Refill: 1  -     CBC Auto Differential; Standing; Expected date: 12/04/2020  -     Comprehensive Metabolic Panel; Standing; Expected date: 12/04/2020  -     Sedimentation rate; Standing; Expected date: 12/04/2020  -     C-Reactive Protein; Standing; Expected date: 12/04/2020    Osteoarthritis of spine with radiculopathy, lumbosacral region    History of coronary artery bypass surgery    High risk medications (not anticoagulants) long-term use  -     CBC Auto Differential; Standing; Expected date: 12/04/2020  -     Comprehensive Metabolic Panel; Standing; Expected date: 12/04/2020  -     Sedimentation rate; Standing; Expected date: 12/04/2020  -     C-Reactive Protein; Standing; Expected date: 12/04/2020  -     CBC Auto Differential; Standing; Expected date: 12/04/2020  -     Comprehensive Metabolic Panel; Standing; Expected date: 12/04/2020  -     Sedimentation rate; Standing; Expected date: 12/04/2020  -     C-Reactive Protein; Standing; Expected date: 12/04/2020    Other orders  -     hydrOXYchloroQUINE (PLAQUENIL) 200 mg tablet; Take 1 tablet (200 mg total) by mouth once daily.  Dispense: 90 tablet; Refill: 4      Continue MTX at 9 tabs weekly  Now on folic acid 1mg daily.   HCQ 200mg once daily  Will need eye exam  Labs printed to be done now and in 3 months. At [place].       Follow up in about 4 months (around 4/4/2021).        30min consultation with greater than 50% spent in counseling, chart review and coordination of care. All questions answered.    Thank you for allowing me to participate in the care of this very pleasant patient.

## 2021-03-05 PROBLEM — D84.9 IMMUNOSUPPRESSION: Status: ACTIVE | Noted: 2021-03-05

## 2021-03-05 PROBLEM — C64.2 RENAL CELL CANCER, LEFT: Status: ACTIVE | Noted: 2021-03-05

## 2021-03-05 PROBLEM — J44.9 CHRONIC OBSTRUCTIVE PULMONARY DISEASE: Status: ACTIVE | Noted: 2021-03-05

## 2021-03-05 PROBLEM — I20.89 STABLE ANGINA PECTORIS: Status: ACTIVE | Noted: 2021-03-05

## 2021-04-26 ENCOUNTER — PATIENT MESSAGE (OUTPATIENT)
Dept: RHEUMATOLOGY | Facility: CLINIC | Age: 66
End: 2021-04-26

## 2021-04-27 ENCOUNTER — OFFICE VISIT (OUTPATIENT)
Dept: RHEUMATOLOGY | Facility: CLINIC | Age: 66
End: 2021-04-27
Payer: COMMERCIAL

## 2021-04-27 VITALS
HEART RATE: 81 BPM | DIASTOLIC BLOOD PRESSURE: 80 MMHG | WEIGHT: 282.31 LBS | SYSTOLIC BLOOD PRESSURE: 131 MMHG | BODY MASS INDEX: 37.41 KG/M2 | HEIGHT: 73 IN

## 2021-04-27 DIAGNOSIS — D84.9 IMMUNOSUPPRESSION: ICD-10-CM

## 2021-04-27 DIAGNOSIS — R21 RASH: ICD-10-CM

## 2021-04-27 DIAGNOSIS — M05.79 RHEUMATOID ARTHRITIS INVOLVING MULTIPLE SITES WITH POSITIVE RHEUMATOID FACTOR: Primary | ICD-10-CM

## 2021-04-27 DIAGNOSIS — Z79.899 HIGH RISK MEDICATIONS (NOT ANTICOAGULANTS) LONG-TERM USE: ICD-10-CM

## 2021-04-27 PROCEDURE — 99999 PR PBB SHADOW E&M-EST. PATIENT-LVL IV: ICD-10-PCS | Mod: PBBFAC,,, | Performed by: INTERNAL MEDICINE

## 2021-04-27 PROCEDURE — 99214 OFFICE O/P EST MOD 30 MIN: CPT | Mod: S$GLB,,, | Performed by: INTERNAL MEDICINE

## 2021-04-27 PROCEDURE — 99214 OFFICE O/P EST MOD 30 MIN: CPT | Mod: PBBFAC,PO | Performed by: INTERNAL MEDICINE

## 2021-04-27 PROCEDURE — 99214 PR OFFICE/OUTPT VISIT, EST, LEVL IV, 30-39 MIN: ICD-10-PCS | Mod: S$GLB,,, | Performed by: INTERNAL MEDICINE

## 2021-04-27 PROCEDURE — 99999 PR PBB SHADOW E&M-EST. PATIENT-LVL IV: CPT | Mod: PBBFAC,,, | Performed by: INTERNAL MEDICINE

## 2021-04-27 RX ORDER — METHOTREXATE 2.5 MG/1
22.5 TABLET ORAL
Qty: 108 TABLET | Refills: 1 | Status: SHIPPED | OUTPATIENT
Start: 2021-04-27 | End: 2021-12-01 | Stop reason: SDUPTHER

## 2021-04-27 RX ORDER — FOLIC ACID 1 MG/1
TABLET ORAL
Qty: 90 TABLET | Refills: 1 | Status: SHIPPED | OUTPATIENT
Start: 2021-04-27 | End: 2021-06-07

## 2021-04-27 ASSESSMENT — ROUTINE ASSESSMENT OF PATIENT INDEX DATA (RAPID3)
MDHAQ FUNCTION SCORE: 0.5
PSYCHOLOGICAL DISTRESS SCORE: 0
PATIENT GLOBAL ASSESSMENT SCORE: 4.5

## 2021-04-29 ENCOUNTER — PATIENT MESSAGE (OUTPATIENT)
Dept: RHEUMATOLOGY | Facility: CLINIC | Age: 66
End: 2021-04-29

## 2021-09-19 ENCOUNTER — PATIENT MESSAGE (OUTPATIENT)
Dept: RHEUMATOLOGY | Facility: CLINIC | Age: 66
End: 2021-09-19

## 2021-09-29 ENCOUNTER — OFFICE VISIT (OUTPATIENT)
Dept: RHEUMATOLOGY | Facility: CLINIC | Age: 66
End: 2021-09-29
Payer: MEDICARE

## 2021-09-29 VITALS
SYSTOLIC BLOOD PRESSURE: 135 MMHG | HEIGHT: 73 IN | BODY MASS INDEX: 37.02 KG/M2 | WEIGHT: 279.31 LBS | DIASTOLIC BLOOD PRESSURE: 80 MMHG

## 2021-09-29 DIAGNOSIS — D84.9 IMMUNOSUPPRESSION: ICD-10-CM

## 2021-09-29 DIAGNOSIS — M05.79 RHEUMATOID ARTHRITIS INVOLVING MULTIPLE SITES WITH POSITIVE RHEUMATOID FACTOR: Primary | ICD-10-CM

## 2021-09-29 PROCEDURE — 99214 PR OFFICE/OUTPT VISIT, EST, LEVL IV, 30-39 MIN: ICD-10-PCS | Mod: S$GLB,,, | Performed by: INTERNAL MEDICINE

## 2021-09-29 PROCEDURE — 99999 PR PBB SHADOW E&M-EST. PATIENT-LVL IV: CPT | Mod: PBBFAC,,, | Performed by: INTERNAL MEDICINE

## 2021-09-29 PROCEDURE — 99999 PR PBB SHADOW E&M-EST. PATIENT-LVL IV: ICD-10-PCS | Mod: PBBFAC,,, | Performed by: INTERNAL MEDICINE

## 2021-09-29 PROCEDURE — 99214 OFFICE O/P EST MOD 30 MIN: CPT | Mod: PBBFAC,PN | Performed by: INTERNAL MEDICINE

## 2021-09-29 PROCEDURE — 99214 OFFICE O/P EST MOD 30 MIN: CPT | Mod: S$GLB,,, | Performed by: INTERNAL MEDICINE

## 2021-09-29 ASSESSMENT — ROUTINE ASSESSMENT OF PATIENT INDEX DATA (RAPID3)
PATIENT GLOBAL ASSESSMENT SCORE: 3
PAIN SCORE: 8
MDHAQ FUNCTION SCORE: 0.7
PSYCHOLOGICAL DISTRESS SCORE: 0
TOTAL RAPID3 SCORE: 4.44
FATIGUE SCORE: 1.1

## 2022-01-20 ENCOUNTER — DOCUMENTATION ONLY (OUTPATIENT)
Dept: RHEUMATOLOGY | Facility: CLINIC | Age: 67
End: 2022-01-20
Payer: MEDICARE

## 2022-01-20 NOTE — PROGRESS NOTES
Per Dr Bautista instructions patient was notified that his recent lab work done at [facility] were ok.   Luisa ANDRADE LPN

## 2022-03-08 PROBLEM — E66.01 SEVERE OBESITY (BMI 35.0-39.9) WITH COMORBIDITY: Status: ACTIVE | Noted: 2022-03-08

## 2022-03-10 ENCOUNTER — PATIENT MESSAGE (OUTPATIENT)
Dept: RHEUMATOLOGY | Facility: CLINIC | Age: 67
End: 2022-03-10
Payer: COMMERCIAL

## 2022-03-11 ENCOUNTER — OFFICE VISIT (OUTPATIENT)
Dept: RHEUMATOLOGY | Facility: CLINIC | Age: 67
End: 2022-03-11
Payer: MEDICARE

## 2022-03-11 VITALS
SYSTOLIC BLOOD PRESSURE: 101 MMHG | HEIGHT: 73 IN | DIASTOLIC BLOOD PRESSURE: 65 MMHG | BODY MASS INDEX: 35.82 KG/M2 | HEART RATE: 93 BPM | WEIGHT: 270.31 LBS

## 2022-03-11 DIAGNOSIS — M05.79 RHEUMATOID ARTHRITIS INVOLVING MULTIPLE SITES WITH POSITIVE RHEUMATOID FACTOR: Primary | ICD-10-CM

## 2022-03-11 DIAGNOSIS — D84.9 IMMUNOSUPPRESSION: ICD-10-CM

## 2022-03-11 PROCEDURE — 99999 PR PBB SHADOW E&M-EST. PATIENT-LVL V: ICD-10-PCS | Mod: PBBFAC,,, | Performed by: INTERNAL MEDICINE

## 2022-03-11 PROCEDURE — 99214 PR OFFICE/OUTPT VISIT, EST, LEVL IV, 30-39 MIN: ICD-10-PCS | Mod: S$GLB,,, | Performed by: INTERNAL MEDICINE

## 2022-03-11 PROCEDURE — 99215 OFFICE O/P EST HI 40 MIN: CPT | Mod: PBBFAC,PN | Performed by: INTERNAL MEDICINE

## 2022-03-11 PROCEDURE — 99999 PR PBB SHADOW E&M-EST. PATIENT-LVL V: CPT | Mod: PBBFAC,,, | Performed by: INTERNAL MEDICINE

## 2022-03-11 PROCEDURE — 99214 OFFICE O/P EST MOD 30 MIN: CPT | Mod: S$GLB,,, | Performed by: INTERNAL MEDICINE

## 2022-03-11 RX ORDER — METHOTREXATE 2.5 MG/1
22.5 TABLET ORAL
Qty: 108 TABLET | Refills: 1 | Status: SHIPPED | OUTPATIENT
Start: 2022-03-11 | End: 2022-04-18

## 2022-03-11 RX ORDER — OXYCODONE AND ACETAMINOPHEN 10; 325 MG/1; MG/1
TABLET ORAL
COMMUNITY
Start: 2022-03-08 | End: 2022-05-16

## 2022-03-11 RX ORDER — CEPHALEXIN 500 MG/1
500 CAPSULE ORAL EVERY 8 HOURS
Qty: 21 CAPSULE | Refills: 0 | Status: SHIPPED | OUTPATIENT
Start: 2022-03-11 | End: 2022-03-18

## 2022-03-11 RX ORDER — FOLIC ACID 1 MG/1
TABLET ORAL
Qty: 90 TABLET | Refills: 1 | Status: SHIPPED | OUTPATIENT
Start: 2022-03-11 | End: 2022-08-18 | Stop reason: SDUPTHER

## 2022-03-11 ASSESSMENT — ROUTINE ASSESSMENT OF PATIENT INDEX DATA (RAPID3)
PSYCHOLOGICAL DISTRESS SCORE: 2.2
PATIENT GLOBAL ASSESSMENT SCORE: 7.5
PAIN SCORE: 7.5
MDHAQ FUNCTION SCORE: 0.5
FATIGUE SCORE: 1.1
TOTAL RAPID3 SCORE: 5.56

## 2022-03-11 NOTE — PROGRESS NOTES
Subjective:          Chief Complaint: Jorge Mckeon is a 66 y.o. male who had concerns including Disease Management.    HPI:    Pt is a 67-year-old gentleman with seropositive rheumatoid arthritis    has a history of renal cell carcinoma s/p partial nephrectomy    Pain 3/10 SOTERO: 0.7.    Low back stiffness chronic intermittent w/o radiculopathy.   Intermittent swelling MCP, + AM stiffness about 30 minutes.     +Rheum nodules.   He is currently on  methotrexate 9 tablets weekly   Folic acid.    And HCQ 200mg  -Dr. Levi -2019.   Prednisone 5mg daily did not take b/c concern over BS. Raises blood sugar.   Previously: Enbrel, Orencia, Remicade, Actemra, Simponi, Cimzia and best overall was Humira with longest success. never RTX.   Never LFA  No other malignancies.   Patient follows with Dr. Chandra for pain management. previous RFA lumbar spine see MRI in [facility]. S/p procedures. Currently with HEP and this is helping.       Due for labs.       REVIEW OF SYSTEMS:    Review of Systems   Constitutional: Negative for fever, malaise/fatigue and weight loss.   HENT: Negative for sore throat.    Eyes: Negative for double vision, photophobia and redness.   Respiratory: Negative for cough, shortness of breath and wheezing.    Cardiovascular: Negative for chest pain, palpitations and orthopnea.   Gastrointestinal: Negative for abdominal pain, constipation and diarrhea.   Genitourinary: Negative for dysuria, hematuria and urgency.   Musculoskeletal: Positive for joint pain. Negative for back pain and myalgias.   Skin: Negative for rash.   Neurological: Negative for dizziness, tingling, focal weakness and headaches.   Endo/Heme/Allergies: Does not bruise/bleed easily.   Psychiatric/Behavioral: Negative for depression, hallucinations and suicidal ideas.               Objective:            Past Medical History:   Diagnosis Date    Anticoagulant long-term use     Cancer     renal CA    CHF (congestive heart failure)     Coronary artery  disease     Diabetes     IDDM    GERD (gastroesophageal reflux disease)     H/O CHF     Hyperlipidemia     stopped cholesterol meds currently    Hypertension     Immunosuppression     Methotrexate for 2 years for RA    Rheumatoid arthritis      Family History   Problem Relation Age of Onset    Cancer Mother     Diabetes Mother     Heart disease Father      Social History     Tobacco Use    Smoking status: Current Every Day Smoker     Packs/day: 0.50     Years: 40.00     Pack years: 20.00     Types: Cigarettes    Smokeless tobacco: Never Used   Substance Use Topics    Alcohol use: No    Drug use: No         Current Outpatient Medications on File Prior to Visit   Medication Sig Dispense Refill    albuterol (PROAIR HFA) 90 mcg/actuation inhaler Inhale 2 puffs into the lungs every 6 (six) hours as needed for Wheezing. Rescue 18 g 0    aspirin (ECOTRIN) 81 MG EC tablet Take 81 mg by mouth once daily.      fenofibrate (TRICOR) 54 MG tablet TAKE ONE TABLET BY MOUTH DAILY *THANK YOU* *SUN BLESS* 90 tablet 1    folic acid (FOLVITE) 1 MG tablet TAKE 1 TABLET BY MOUTH DAILY *THANK YOU* *SUN JERRYSS* 90 tablet 1    furosemide (LASIX) 20 MG tablet Take 1 tablet (20 mg total) by mouth once daily. 90 tablet 3    methotrexate 2.5 MG Tab Take 9 tablets (22.5 mg total) by mouth every 7 days. 108 tablet 1    oxyCODONE-acetaminophen (PERCOCET)  mg per tablet SMARTSI Tablet(s) By Mouth Every 12 Hours PRN      pantoprazole (PROTONIX) 40 MG tablet TAKE 1 TABLET BY MOUTH DAILY *THANK YOU* *SUN JERRYSS* 90 tablet 2    pravastatin (PRAVACHOL) 80 MG tablet Take 1 tablet (80 mg total) by mouth once daily. 90 tablet 2    promethazine (PHENERGAN) 12.5 MG Tab Take 1 tablet (12.5 mg total) by mouth every 6 (six) hours as needed. 10 tablet 0    sacubitriL-valsartan (ENTRESTO)  mg per tablet TAKE 1 TABLET BY MOUTH TWICE DAILY *THANK YOU* *SUN JERRYSS* 60 tablet 5    SITagliptan-metformin (JANUMET) 50-1,000 mg per  tablet TAKE 1 TABLET BY MOUTH 2 TIMES DAILY WITH MEALS needs appt 60 tablet 0    spironolactone (ALDACTONE) 25 MG tablet Take 1 tablet (25 mg total) by mouth once daily. 90 tablet 1    tramadol (ULTRAM-ER) 300 MG Tb24 Take 300 mg by mouth once daily.      carvediloL (COREG) 6.25 MG tablet TAKE 1 TABLET BY MOUTH TWICE DAILY *THANK YOU* *GOD BLESS* (Patient not taking: Reported on 3/11/2022) 60 tablet 11    DULoxetine (CYMBALTA) 30 MG capsule TAKE 1 CAPSULE BY MOUTH EVERY DAY *THANK YOU* *GOD BLESS* (Patient not taking: Reported on 3/11/2022) 90 capsule 2    DULoxetine (CYMBALTA) 60 MG capsule Take 1 capsule (60 mg total) by mouth once daily. (Patient not taking: Reported on 3/11/2022) 90 capsule 2    fluticasone-salmeterol diskus inhaler 100-50 mcg INHALE ONE PUFF BY MOUTH TWICE DAILY AS DIRECTED. *THANK YOU* *SUN BLESS* (Patient not taking: Reported on 3/11/2022) 180 each 2    hydrOXYchloroQUINE (PLAQUENIL) 200 mg tablet Take 1 tablet (200 mg total) by mouth once daily. (Patient not taking: Reported on 3/11/2022) 90 tablet 4    insulin aspart protamine-insulin aspart (NOVOLOG MIX 70-30FLEXPEN U-100) 100 unit/mL (70-30) InPn pen Inject 71 Units into the skin 2 (two) times daily. 135 mL 2    mometasone 0.1% (ELOCON) 0.1 % cream Apply to affected area daily (Patient not taking: Reported on 3/11/2022) 15 g 0    nitroGLYCERIN (NITROSTAT) 0.3 MG SL tablet Place 1 tablet (0.3 mg total) under the tongue every 5 (five) minutes as needed for Chest pain. (Patient not taking: Reported on 3/11/2022) 25 tablet 0    sildenafiL (VIAGRA) 100 MG tablet Take 1 tablet (100 mg total) by mouth daily as needed. (Patient not taking: Reported on 3/11/2022) 6 tablet 2    XTAMPZA ER 13.5 mg CSpT        Current Facility-Administered Medications on File Prior to Visit   Medication Dose Route Frequency Provider Last Rate Last Admin    acetaminophen tablet 650 mg  650 mg Oral Once PRN Dayo Betancoutr MD        albuterol inhaler 2  puff  2 puff Inhalation Q20 Min PRN Dayo Betancourt MD        diphenhydrAMINE injection 25 mg  25 mg Intravenous Once PRN Dayo Betancourt MD        EPINEPHrine injection 0.3 mg  0.3 mg Intramuscular PRN Dayo Betancourt MD        methylPREDNISolone sodium succinate injection 40 mg  40 mg Intravenous Once PRN Dayo Betancourt MD        ondansetron disintegrating tablet 4 mg  4 mg Oral Once PRN Dayo Betancourt MD        sodium chloride 0.9% 500 mL flush bag   Intravenous PRN Dayo Betancourt MD        sodium chloride 0.9% flush 10 mL  10 mL Intravenous PRN Dayo Betancourt MD           Vitals:    03/11/22 0903   BP: 101/65   Pulse: 93       Physical Exam:    Physical Exam  Constitutional:       Appearance: He is well-developed.   HENT:      Head: Normocephalic.      Right Ear: Hearing normal.      Left Ear: Hearing normal.      Nose: No rhinorrhea.      Mouth/Throat:      Pharynx: Uvula midline.   Eyes:      Conjunctiva/sclera: Conjunctivae normal.      Pupils: Pupils are equal, round, and reactive to light.   Cardiovascular:      Rate and Rhythm: Normal rate and regular rhythm.      Heart sounds: Normal heart sounds.   Pulmonary:      Effort: Pulmonary effort is normal.      Breath sounds: Normal breath sounds.   Musculoskeletal:      Right shoulder: No swelling or tenderness. Normal range of motion.      Left shoulder: No swelling or tenderness. Normal range of motion.      Right wrist: No swelling or tenderness. Normal range of motion.      Left wrist: No swelling or tenderness. Normal range of motion.      Right hand: Swelling and tenderness present. Normal range of motion.      Left hand: Tenderness present. No swelling. Decreased range of motion.      Right knee: No swelling. Normal range of motion. No tenderness.      Left knee: No swelling. Normal range of motion. No tenderness.      Right ankle: No swelling. No tenderness. Normal range of motion.      Left ankle: No swelling. No  tenderness. Normal range of motion.      Right foot: Normal range of motion. No swelling or tenderness.      Left foot: Normal range of motion. No swelling or tenderness.      Comments: ruight rheum nodule on elbow. No flexion contracture. Right 2,3 MCP with swelling.    Skin:     General: Skin is warm and dry.   Neurological:      Mental Status: He is oriented to person, place, and time.   Psychiatric:         Speech: Speech normal.         Behavior: Behavior normal.               Assessment:       Encounter Diagnoses   Name Primary?    Rheumatoid arthritis involving multiple sites with positive rheumatoid factor Yes    Immunosuppression           Plan:        Rheumatoid arthritis involving multiple sites with positive rheumatoid factor  -     Comprehensive Metabolic Panel; Standing; Expected date: 03/11/2022  -     CBC Auto Differential; Standing; Expected date: 03/11/2022  -     Sedimentation rate; Standing; Expected date: 03/11/2022  -     C-Reactive Protein; Standing; Expected date: 03/11/2022  -     methotrexate 2.5 MG Tab; Take 9 tablets (22.5 mg total) by mouth every 7 days.  Dispense: 108 tablet; Refill: 1  -     folic acid (FOLVITE) 1 MG tablet; TAKE 1 TABLET BY MOUTH DAILY *THANK YOU* *SUN DE LA VEGA*  Dispense: 90 tablet; Refill: 1    Immunosuppression  -     Comprehensive Metabolic Panel; Standing; Expected date: 03/11/2022  -     CBC Auto Differential; Standing; Expected date: 03/11/2022  -     Sedimentation rate; Standing; Expected date: 03/11/2022  -     C-Reactive Protein; Standing; Expected date: 03/11/2022    Other orders  -     cephALEXin (KEFLEX) 500 MG capsule; Take 1 capsule (500 mg total) by mouth every 8 (eight) hours. for 7 days  Dispense: 21 capsule; Refill: 0      Continue MTX at 9 tabs weekly   folic acid 1mg daily.   HCQ 200mg once daily  Will need eye exam recent 2021 with Dr. Mcguire.   Labs printed to be done now and in 3 months. At [place].     Skin infection treat today with  keflex.   Follow up in about 4 months (around 7/11/2022).        30min consultation with greater than 50% spent in counseling, chart review and coordination of care. All questions answered.    Thank you for allowing me to participate in the care of this very pleasant patient.

## 2022-05-18 PROBLEM — R94.39 ABNORMAL STRESS TEST: Status: RESOLVED | Noted: 2019-02-06 | Resolved: 2022-05-18

## 2022-08-09 PROBLEM — E11.3293 MILD NONPROLIFERATIVE DIABETIC RETINOPATHY OF BOTH EYES WITHOUT MACULAR EDEMA ASSOCIATED WITH TYPE 2 DIABETES MELLITUS: Status: ACTIVE | Noted: 2022-08-09

## 2022-08-18 ENCOUNTER — OFFICE VISIT (OUTPATIENT)
Dept: RHEUMATOLOGY | Facility: CLINIC | Age: 67
End: 2022-08-18
Payer: MEDICARE

## 2022-08-18 VITALS
HEIGHT: 72 IN | DIASTOLIC BLOOD PRESSURE: 75 MMHG | SYSTOLIC BLOOD PRESSURE: 130 MMHG | BODY MASS INDEX: 36.52 KG/M2 | HEART RATE: 83 BPM | WEIGHT: 269.63 LBS

## 2022-08-18 DIAGNOSIS — D84.9 IMMUNOSUPPRESSION: ICD-10-CM

## 2022-08-18 DIAGNOSIS — M05.79 RHEUMATOID ARTHRITIS INVOLVING MULTIPLE SITES WITH POSITIVE RHEUMATOID FACTOR: Primary | ICD-10-CM

## 2022-08-18 PROCEDURE — 3288F FALL RISK ASSESSMENT DOCD: CPT | Mod: CPTII,S$GLB,, | Performed by: INTERNAL MEDICINE

## 2022-08-18 PROCEDURE — 3044F PR MOST RECENT HEMOGLOBIN A1C LEVEL <7.0%: ICD-10-PCS | Mod: CPTII,S$GLB,, | Performed by: INTERNAL MEDICINE

## 2022-08-18 PROCEDURE — 1101F PR PT FALLS ASSESS DOC 0-1 FALLS W/OUT INJ PAST YR: ICD-10-PCS | Mod: CPTII,S$GLB,, | Performed by: INTERNAL MEDICINE

## 2022-08-18 PROCEDURE — 3078F PR MOST RECENT DIASTOLIC BLOOD PRESSURE < 80 MM HG: ICD-10-PCS | Mod: CPTII,S$GLB,, | Performed by: INTERNAL MEDICINE

## 2022-08-18 PROCEDURE — 4010F PR ACE/ARB THEARPY RXD/TAKEN: ICD-10-PCS | Mod: CPTII,S$GLB,, | Performed by: INTERNAL MEDICINE

## 2022-08-18 PROCEDURE — 1159F MED LIST DOCD IN RCRD: CPT | Mod: CPTII,S$GLB,, | Performed by: INTERNAL MEDICINE

## 2022-08-18 PROCEDURE — 3288F PR FALLS RISK ASSESSMENT DOCUMENTED: ICD-10-PCS | Mod: CPTII,S$GLB,, | Performed by: INTERNAL MEDICINE

## 2022-08-18 PROCEDURE — 1159F PR MEDICATION LIST DOCUMENTED IN MEDICAL RECORD: ICD-10-PCS | Mod: CPTII,S$GLB,, | Performed by: INTERNAL MEDICINE

## 2022-08-18 PROCEDURE — 99999 PR PBB SHADOW E&M-EST. PATIENT-LVL IV: CPT | Mod: PBBFAC,,, | Performed by: INTERNAL MEDICINE

## 2022-08-18 PROCEDURE — 3044F HG A1C LEVEL LT 7.0%: CPT | Mod: CPTII,S$GLB,, | Performed by: INTERNAL MEDICINE

## 2022-08-18 PROCEDURE — 99214 PR OFFICE/OUTPT VISIT, EST, LEVL IV, 30-39 MIN: ICD-10-PCS | Mod: S$GLB,,, | Performed by: INTERNAL MEDICINE

## 2022-08-18 PROCEDURE — 3078F DIAST BP <80 MM HG: CPT | Mod: CPTII,S$GLB,, | Performed by: INTERNAL MEDICINE

## 2022-08-18 PROCEDURE — 3008F PR BODY MASS INDEX (BMI) DOCUMENTED: ICD-10-PCS | Mod: CPTII,S$GLB,, | Performed by: INTERNAL MEDICINE

## 2022-08-18 PROCEDURE — 4010F ACE/ARB THERAPY RXD/TAKEN: CPT | Mod: CPTII,S$GLB,, | Performed by: INTERNAL MEDICINE

## 2022-08-18 PROCEDURE — 3008F BODY MASS INDEX DOCD: CPT | Mod: CPTII,S$GLB,, | Performed by: INTERNAL MEDICINE

## 2022-08-18 PROCEDURE — 1101F PT FALLS ASSESS-DOCD LE1/YR: CPT | Mod: CPTII,S$GLB,, | Performed by: INTERNAL MEDICINE

## 2022-08-18 PROCEDURE — 3075F SYST BP GE 130 - 139MM HG: CPT | Mod: CPTII,S$GLB,, | Performed by: INTERNAL MEDICINE

## 2022-08-18 PROCEDURE — 99214 OFFICE O/P EST MOD 30 MIN: CPT | Mod: S$GLB,,, | Performed by: INTERNAL MEDICINE

## 2022-08-18 PROCEDURE — 3075F PR MOST RECENT SYSTOLIC BLOOD PRESS GE 130-139MM HG: ICD-10-PCS | Mod: CPTII,S$GLB,, | Performed by: INTERNAL MEDICINE

## 2022-08-18 PROCEDURE — 99999 PR PBB SHADOW E&M-EST. PATIENT-LVL IV: ICD-10-PCS | Mod: PBBFAC,,, | Performed by: INTERNAL MEDICINE

## 2022-08-18 RX ORDER — METHOTREXATE 2.5 MG/1
TABLET ORAL
Qty: 108 TABLET | Refills: 1 | Status: SHIPPED | OUTPATIENT
Start: 2022-08-18 | End: 2022-09-27 | Stop reason: SDUPTHER

## 2022-08-18 RX ORDER — FOLIC ACID 1 MG/1
TABLET ORAL
Qty: 90 TABLET | Refills: 1 | Status: SHIPPED | OUTPATIENT
Start: 2022-08-18 | End: 2022-09-27 | Stop reason: SDUPTHER

## 2022-08-18 ASSESSMENT — ROUTINE ASSESSMENT OF PATIENT INDEX DATA (RAPID3)
MDHAQ FUNCTION SCORE: 0.6
PAIN SCORE: 9
FATIGUE SCORE: 1.1
PSYCHOLOGICAL DISTRESS SCORE: 0
PATIENT GLOBAL ASSESSMENT SCORE: 6.5
TOTAL RAPID3 SCORE: 5.83

## 2022-08-18 NOTE — PROGRESS NOTES
Subjective:          Chief Complaint: Jorge Mckeon is a 67 y.o. male who had concerns including Disease Management.    HPI:    Pt is a 67-year-old gentleman with seropositive rheumatoid arthritis    has a history of renal cell carcinoma s/p partial nephrectomy    Pain 3/10 SOTERO: 0.7.    Low back stiffness chronic intermittent w/o radiculopathy.   Intermittent swelling MCP, + AM stiffness about 30 minutes.     +Rheum nodules.   He is currently on  methotrexate 9 tablets weekly   Folic acid.    And HCQ 200mg  -Dr. Levi -2019.   Prednisone 5mg daily did not take b/c concern over BS. Raises blood sugar.   Previously: Enbrel, Orencia, Remicade, Actemra, Simponi, Cimzia and best overall was Humira with longest success. never RTX.   Never LFA  No other malignancies.   Patient follows with Dr. Chandra for pain management. previous RFA lumbar spine see MRI in [facility]. S/p procedures. Currently with HEP and this is helping.       Due for labs.       REVIEW OF SYSTEMS:    Review of Systems   Constitutional: Negative for fever, malaise/fatigue and weight loss.   HENT: Negative for sore throat.    Eyes: Negative for double vision, photophobia and redness.   Respiratory: Negative for cough, shortness of breath and wheezing.    Cardiovascular: Negative for chest pain, palpitations and orthopnea.   Gastrointestinal: Negative for abdominal pain, constipation and diarrhea.   Genitourinary: Negative for dysuria, hematuria and urgency.   Musculoskeletal: Positive for joint pain. Negative for back pain and myalgias.   Skin: Negative for rash.   Neurological: Negative for dizziness, tingling, focal weakness and headaches.   Endo/Heme/Allergies: Does not bruise/bleed easily.   Psychiatric/Behavioral: Negative for depression, hallucinations and suicidal ideas.               Objective:            Past Medical History:   Diagnosis Date    Anticoagulant long-term use     Cancer     renal CA    CHF (congestive heart failure)     Coronary artery  disease     Current smoker 2015    Diabetes     IDDM    Encounter for blood transfusion     GERD (gastroesophageal reflux disease)     H/O CHF     Hyperlipidemia     stopped cholesterol meds currently    Hypertension     Immunosuppression     Methotrexate for 2 years for RA    Rheumatoid arthritis      Family History   Problem Relation Age of Onset    Cancer Mother     Diabetes Mother     Heart disease Father      Social History     Tobacco Use    Smoking status: Former Smoker     Packs/day: 0.25     Years: 40.00     Pack years: 10.00     Types: Cigarettes     Quit date: 2022     Years since quittin.5    Smokeless tobacco: Never Used    Tobacco comment: 5 A DAY   Substance Use Topics    Alcohol use: No    Drug use: No         Current Outpatient Medications on File Prior to Visit   Medication Sig Dispense Refill    albuterol (PROAIR HFA) 90 mcg/actuation inhaler Inhale 2 puffs into the lungs every 6 (six) hours as needed for Wheezing. Rescue 18 g 0    aspirin (ECOTRIN) 81 MG EC tablet Take 81 mg by mouth once daily.      carvediloL (COREG) 6.25 MG tablet TAKE 1 TABLET BY MOUTH TWICE DAILY *THANK YOU* *[place]UN DE [place] VEGA* 60 tablet 11    fenofibrate (TRICOR) 54 MG tablet TAKE ONE TABLET BY MOUTH DAILY 90 tablet 1    fluticasone-salmeterol diskus inhaler 100-50 mcg INHALE ONE PUFF BY MOUTH TWICE DAILY AS DIRECTED. *THANK YOU* *SUN DE [place] VEGA* 180 each 2    furosemide (LASIX) 20 MG tablet Take 1 tablet (20 mg total) by mouth once daily. 90 tablet 3    gabapentin (NEURONTIN) 800 MG tablet Take 800 mg by mouth 3 (three) times daily.      metFORMIN (GLUCOPHAGE-XR) 750 MG ER 24hr tablet Take 2 tablets (1,500 mg total) by mouth daily with breakfast. 180 tablet 1    oxyCODONE-acetaminophen (PERCOCET)  mg per tablet Take 1 tablet by mouth 2 (two) times daily as needed for Pain.      pantoprazole (PROTONIX) 40 MG tablet TAKE 1 TABLET BY MOUTH DAILY *THANK YOU* *SUN [place] VEGA* 90 tablet 2     pravastatin (PRAVACHOL) 80 MG tablet TAKE 1 TABLET BY MOUTH DAILY *THANK YOU* *GOD BLESS* 90 tablet 2    promethazine (PHENERGAN) 12.5 MG Tab Take 1 tablet (12.5 mg total) by mouth every 6 (six) hours as needed. 10 tablet 0    sacubitriL-valsartan (ENTRESTO) 49-51 mg per tablet Take 1 tablet by mouth 2 (two) times daily. 60 tablet 11    sildenafiL (VIAGRA) 100 MG tablet Take 1 tablet (100 mg total) by mouth daily as needed. 6 tablet 2    spironolactone (ALDACTONE) 25 MG tablet Take 1 tablet (25 mg total) by mouth once daily. 90 tablet 3    tramadol (ULTRAM-ER) 300 MG Tb24 Take 300 mg by mouth once daily.      [DISCONTINUED] folic acid (FOLVITE) 1 MG tablet TAKE 1 TABLET BY MOUTH DAILY *THANK YOU* *GOD BLESS* 90 tablet 1    [DISCONTINUED] methotrexate 2.5 MG Tab TAKE 9 TABLETS BY MOUTH EVERY 7 DAYS *THANK YOU* *GOD BLESS* 108 tablet 1    insulin aspart protamine-insulin aspart (NOVOLOG MIX 70-30FLEXPEN U-100) 100 unit/mL (70-30) InPn pen Inject 71 Units into the skin 2 (two) times daily. (Patient taking differently: Inject 68 Units into the skin 2 (two) times daily.) 135 mL 2    nitroGLYCERIN (NITROSTAT) 0.3 MG SL tablet Place 1 tablet (0.3 mg total) under the tongue every 5 (five) minutes as needed for Chest pain. (Patient not taking: Reported on 8/18/2022) 25 tablet 0    [DISCONTINUED] DULoxetine (CYMBALTA) 60 MG capsule Take 1 capsule (60 mg total) by mouth once daily. 90 capsule 2    [DISCONTINUED] hydrOXYchloroQUINE (PLAQUENIL) 200 mg tablet Take 1 tablet (200 mg total) by mouth once daily. 90 tablet 4     No current facility-administered medications on file prior to visit.       Vitals:    08/18/22 1353   BP: 130/75   Pulse: 83       Physical Exam:    Physical Exam  Constitutional:       Appearance: He is well-developed.   HENT:      Head: Normocephalic.      Right Ear: Hearing normal.      Left Ear: Hearing normal.      Nose: No rhinorrhea.      Mouth/Throat:      Pharynx: Uvula midline.   Eyes:       Conjunctiva/sclera: Conjunctivae normal.      Pupils: Pupils are equal, round, and reactive to light.   Cardiovascular:      Rate and Rhythm: Normal rate and regular rhythm.      Heart sounds: Normal heart sounds.   Pulmonary:      Effort: Pulmonary effort is normal.      Breath sounds: Normal breath sounds.   Musculoskeletal:      Right shoulder: No swelling or tenderness. Normal range of motion.      Left shoulder: No swelling or tenderness. Normal range of motion.      Right wrist: No swelling or tenderness. Normal range of motion.      Left wrist: No swelling or tenderness. Normal range of motion.      Right hand: Swelling and tenderness present. Normal range of motion.      Left hand: Tenderness present. No swelling. Decreased range of motion.      Right knee: No swelling. Normal range of motion. No tenderness.      Left knee: No swelling. Normal range of motion. No tenderness.      Right ankle: No swelling. No tenderness. Normal range of motion.      Left ankle: No swelling. No tenderness. Normal range of motion.      Right foot: Normal range of motion. No swelling or tenderness.      Left foot: Normal range of motion. No swelling or tenderness.      Comments: ruight rheum nodule on elbow. No flexion contracture. Right 2,3 MCP with swelling.    Skin:     General: Skin is warm and dry.   Neurological:      Mental Status: He is oriented to person, place, and time.   Psychiatric:         Speech: Speech normal.         Behavior: Behavior normal.               Assessment:       Encounter Diagnoses   Name Primary?    Rheumatoid arthritis involving multiple sites with positive rheumatoid factor Yes    Immunosuppression           Plan:        Rheumatoid arthritis involving multiple sites with positive rheumatoid factor  -     methotrexate 2.5 MG Tab; TAKE 9 TABLETS BY MOUTH EVERY 7 DAYS *THANK YOU* *SUN DE [place] VEGA*  Dispense: 108 tablet; Refill: 1  -     folic acid (FOLVITE) 1 MG tablet; TAKE 1 TABLET BY MOUTH DAILY  *THANK YOU* *SUN DE [place] VEGA*  Dispense: 90 tablet; Refill: 1  -     CBC Auto Differential; Standing; Expected date: 08/18/2022  -     Comprehensive Metabolic Panel; Standing; Expected date: 08/18/2022  -     Sedimentation rate; Standing; Expected date: 08/18/2022  -     C-Reactive Protein; Standing; Expected date: 08/18/2022    Immunosuppression  -     methotrexate 2.5 MG Tab; TAKE 9 TABLETS BY MOUTH EVERY 7 DAYS *THANK YOU* *SUN DE [place] VEGA*  Dispense: 108 tablet; Refill: 1  -     folic acid (FOLVITE) 1 MG tablet; TAKE 1 TABLET BY MOUTH DAILY *THANK YOU* *SUN DE [place] VEGA*  Dispense: 90 tablet; Refill: 1  -     CBC Auto Differential; Standing; Expected date: 08/18/2022  -     Comprehensive Metabolic Panel; Standing; Expected date: 08/18/2022  -     Sedimentation rate; Standing; Expected date: 08/18/2022  -     C-Reactive Protein; Standing; Expected date: 08/18/2022      Continue MTX at 9 tabs weekly   folic acid 1mg daily.   HCQ 200mg once daily  Will need eye exam recent 2021 with Dr. Mcguire.   Labs printed to be done now and in 3 months. At [place].       No follow-ups on file.        30min consultation with greater than 50% spent in counseling, chart review and coordination of care. All questions answered.    Thank you for allowing me to participate in the care of this very pleasant patient.

## 2022-08-23 ENCOUNTER — DOCUMENTATION ONLY (OUTPATIENT)
Dept: PHARMACY | Facility: CLINIC | Age: 67
End: 2022-08-23
Payer: MEDICARE

## 2022-08-23 NOTE — PROGRESS NOTES
PA RECEIVED FOR METHOTREXATE 2.5 MG BUT NOT NEEDED. PHARMACY RE RAN SCRIPT FOR THE CORRECT AMOUNT OF DAYS AND APPROVED    JAIMIE EMRCEDES CPHT  MED ACCESS

## 2022-10-14 ENCOUNTER — PATIENT MESSAGE (OUTPATIENT)
Dept: RHEUMATOLOGY | Facility: CLINIC | Age: 67
End: 2022-10-14
Payer: MEDICARE

## 2022-10-17 ENCOUNTER — TELEPHONE (OUTPATIENT)
Dept: RHEUMATOLOGY | Facility: CLINIC | Age: 67
End: 2022-10-17
Payer: MEDICARE

## 2022-10-17 NOTE — TELEPHONE ENCOUNTER
Spoke to [facility] and clarified #108 is for number of tablets, not number of days. Britni has misunderstood and it is 9 tablets per week , 108 tablets for 90 day supply. Patient asking for this clarification so this will not be denied. [facility] will call if they have trouble. SIMI

## 2022-12-05 ENCOUNTER — OFFICE VISIT (OUTPATIENT)
Dept: RHEUMATOLOGY | Facility: CLINIC | Age: 67
End: 2022-12-05
Payer: MEDICARE

## 2022-12-05 VITALS
HEIGHT: 73 IN | SYSTOLIC BLOOD PRESSURE: 128 MMHG | WEIGHT: 270.5 LBS | DIASTOLIC BLOOD PRESSURE: 76 MMHG | HEART RATE: 82 BPM | BODY MASS INDEX: 35.85 KG/M2

## 2022-12-05 DIAGNOSIS — D84.9 IMMUNOSUPPRESSION: ICD-10-CM

## 2022-12-05 DIAGNOSIS — M05.79 RHEUMATOID ARTHRITIS INVOLVING MULTIPLE SITES WITH POSITIVE RHEUMATOID FACTOR: Primary | ICD-10-CM

## 2022-12-05 PROCEDURE — 3008F BODY MASS INDEX DOCD: CPT | Mod: CPTII,S$GLB,, | Performed by: INTERNAL MEDICINE

## 2022-12-05 PROCEDURE — 3078F PR MOST RECENT DIASTOLIC BLOOD PRESSURE < 80 MM HG: ICD-10-PCS | Mod: CPTII,S$GLB,, | Performed by: INTERNAL MEDICINE

## 2022-12-05 PROCEDURE — 3288F FALL RISK ASSESSMENT DOCD: CPT | Mod: CPTII,S$GLB,, | Performed by: INTERNAL MEDICINE

## 2022-12-05 PROCEDURE — 3008F PR BODY MASS INDEX (BMI) DOCUMENTED: ICD-10-PCS | Mod: CPTII,S$GLB,, | Performed by: INTERNAL MEDICINE

## 2022-12-05 PROCEDURE — 3074F PR MOST RECENT SYSTOLIC BLOOD PRESSURE < 130 MM HG: ICD-10-PCS | Mod: CPTII,S$GLB,, | Performed by: INTERNAL MEDICINE

## 2022-12-05 PROCEDURE — 99214 OFFICE O/P EST MOD 30 MIN: CPT | Mod: S$GLB,,, | Performed by: INTERNAL MEDICINE

## 2022-12-05 PROCEDURE — 99999 PR PBB SHADOW E&M-EST. PATIENT-LVL III: ICD-10-PCS | Mod: PBBFAC,,, | Performed by: INTERNAL MEDICINE

## 2022-12-05 PROCEDURE — 1159F MED LIST DOCD IN RCRD: CPT | Mod: CPTII,S$GLB,, | Performed by: INTERNAL MEDICINE

## 2022-12-05 PROCEDURE — 1101F PR PT FALLS ASSESS DOC 0-1 FALLS W/OUT INJ PAST YR: ICD-10-PCS | Mod: CPTII,S$GLB,, | Performed by: INTERNAL MEDICINE

## 2022-12-05 PROCEDURE — 3074F SYST BP LT 130 MM HG: CPT | Mod: CPTII,S$GLB,, | Performed by: INTERNAL MEDICINE

## 2022-12-05 PROCEDURE — 4010F PR ACE/ARB THEARPY RXD/TAKEN: ICD-10-PCS | Mod: CPTII,S$GLB,, | Performed by: INTERNAL MEDICINE

## 2022-12-05 PROCEDURE — 1159F PR MEDICATION LIST DOCUMENTED IN MEDICAL RECORD: ICD-10-PCS | Mod: CPTII,S$GLB,, | Performed by: INTERNAL MEDICINE

## 2022-12-05 PROCEDURE — 3044F PR MOST RECENT HEMOGLOBIN A1C LEVEL <7.0%: ICD-10-PCS | Mod: CPTII,S$GLB,, | Performed by: INTERNAL MEDICINE

## 2022-12-05 PROCEDURE — 3044F HG A1C LEVEL LT 7.0%: CPT | Mod: CPTII,S$GLB,, | Performed by: INTERNAL MEDICINE

## 2022-12-05 PROCEDURE — 1101F PT FALLS ASSESS-DOCD LE1/YR: CPT | Mod: CPTII,S$GLB,, | Performed by: INTERNAL MEDICINE

## 2022-12-05 PROCEDURE — 99999 PR PBB SHADOW E&M-EST. PATIENT-LVL III: CPT | Mod: PBBFAC,,, | Performed by: INTERNAL MEDICINE

## 2022-12-05 PROCEDURE — 3288F PR FALLS RISK ASSESSMENT DOCUMENTED: ICD-10-PCS | Mod: CPTII,S$GLB,, | Performed by: INTERNAL MEDICINE

## 2022-12-05 PROCEDURE — 99214 PR OFFICE/OUTPT VISIT, EST, LEVL IV, 30-39 MIN: ICD-10-PCS | Mod: S$GLB,,, | Performed by: INTERNAL MEDICINE

## 2022-12-05 PROCEDURE — 4010F ACE/ARB THERAPY RXD/TAKEN: CPT | Mod: CPTII,S$GLB,, | Performed by: INTERNAL MEDICINE

## 2022-12-05 PROCEDURE — 3078F DIAST BP <80 MM HG: CPT | Mod: CPTII,S$GLB,, | Performed by: INTERNAL MEDICINE

## 2022-12-05 NOTE — PROGRESS NOTES
Subjective:          Chief Complaint: Jorge Mckeon is a 67 y.o. male who had no chief complaint listed for this encounter.    HPI:    Pt is a 67-year-old gentleman with seropositive rheumatoid arthritis    has a history of renal cell carcinoma s/p partial nephrectomy    Pain 3/10 SOTERO: 0.7.    Low back stiffness chronic intermittent w/o radiculopathy.   Intermittent swelling MCP, + AM stiffness about 30 minutes.     +Rheum nodules.   He is currently on  methotrexate 9 tablets weekly   Folic acid.    And HCQ 200mg  -Dr. Levi -2019.   Prednisone 5mg daily did not take b/c concern over BS. Raises blood sugar.   Previously: Enbrel, Orencia, Remicade, Actemra, Simponi, Cimzia and best overall was Humira with longest success. never RTX.   Never LFA  No other malignancies.   Patient follows with Dr. Chandra for pain management. previous RFA lumbar spine see MRI in [facility]. S/p procedures. Currently with HEP and this is helping.        REVIEW OF SYSTEMS:    Review of Systems   Constitutional:  Negative for fever, malaise/fatigue and weight loss.   HENT:  Negative for sore throat.    Eyes:  Negative for double vision, photophobia and redness.   Respiratory:  Negative for cough, shortness of breath and wheezing.    Cardiovascular:  Negative for chest pain, palpitations and orthopnea.   Gastrointestinal:  Negative for abdominal pain, constipation and diarrhea.   Genitourinary:  Negative for dysuria, hematuria and urgency.   Musculoskeletal:  Positive for joint pain. Negative for back pain and myalgias.   Skin:  Negative for rash.   Neurological:  Negative for dizziness, tingling, focal weakness and headaches.   Endo/Heme/Allergies:  Does not bruise/bleed easily.   Psychiatric/Behavioral:  Negative for depression, hallucinations and suicidal ideas.              Objective:            Past Medical History:   Diagnosis Date    Anticoagulant long-term use     Cancer     renal CA    CHF (congestive heart failure)     Coronary artery  disease     Current smoker 2015    Diabetes     IDDM    Encounter for blood transfusion     GERD (gastroesophageal reflux disease)     H/O CHF     Hyperlipidemia     stopped cholesterol meds currently    Hypertension     Immunosuppression     Methotrexate for 2 years for RA    Rheumatoid arthritis      Family History   Problem Relation Age of Onset    Cancer Mother     Diabetes Mother     Heart disease Father      Social History     Tobacco Use    Smoking status: Former     Packs/day: 1.50     Years: 50.00     Pack years: 75.00     Types: Cigarettes     Start date:      Quit date: 2022     Years since quittin.8    Smokeless tobacco: Never    Tobacco comments:     5 A DAY   Substance Use Topics    Alcohol use: No    Drug use: No         Current Outpatient Medications on File Prior to Visit   Medication Sig Dispense Refill    aspirin (ECOTRIN) 81 MG EC tablet Take 81 mg by mouth once daily.      carvediloL (COREG) 6.25 MG tablet TAKE 1 TABLET BY MOUTH TWICE DAILY *THANK YOU* *SUN DE [place] VEGA* 60 tablet 11    fenofibrate (TRICOR) 54 MG tablet TAKE ONE TABLET BY MOUTH DAILY 90 tablet 1    fluticasone-salmeterol diskus inhaler 100-50 mcg INHALE ONE PUFF BY MOUTH TWICE DAILY AS DIRECTED. *THANK YOU* *SUN DE [place] VEGA* 180 each 2    folic acid (FOLVITE) 1 MG tablet TAKE 1 TABLET BY MOUTH DAILY 90 tablet 3    furosemide (LASIX) 20 MG tablet Take 1 tablet (20 mg total) by mouth once daily. 90 tablet 3    gabapentin (NEURONTIN) 600 MG tablet Take 600 mg by mouth 3 (three) times daily.      hydrOXYchloroQUINE (PLAQUENIL) 200 mg tablet Take 1 tablet (200 mg total) by mouth once daily. 30 tablet 6    metFORMIN (GLUCOPHAGE-XR) 750 MG ER 24hr tablet Take 2 tablets (1,500 mg total) by mouth daily with breakfast. 180 tablet 1    methotrexate 2.5 MG Tab TAKE 9 TABLETS BY MOUTH EVERY 7 DAYS 108 tablet 3    nitroGLYCERIN (NITROSTAT) 0.3 MG SL tablet Place 1 tablet (0.3 mg total) under the tongue every 5 (five) minutes as needed for  Chest pain. 25 tablet 0    NOVOLOG MIX 70-30FLEXPEN U-100 100 unit/mL (70-30) InPn pen INJECT 71 UNITS INTO THE SKIN 2 TIMES DAILY *THANK YOU* *GOD BLESS* 27 mL 2    oxyCODONE-acetaminophen (PERCOCET)  mg per tablet Take 1 tablet by mouth 2 (two) times daily as needed for Pain.      pantoprazole (PROTONIX) 40 MG tablet Take 1 tablet (40 mg total) by mouth once daily. 90 tablet 2    pravastatin (PRAVACHOL) 80 MG tablet TAKE 1 TABLET BY MOUTH DAILY *THANK YOU* *GOD BLESS* 90 tablet 2    promethazine (PHENERGAN) 12.5 MG Tab Take 1 tablet (12.5 mg total) by mouth every 6 (six) hours as needed. 10 tablet 0    sacubitriL-valsartan (ENTRESTO) 49-51 mg per tablet Take 1 tablet by mouth 2 (two) times daily. 60 tablet 11    sildenafiL (VIAGRA) 100 MG tablet Take 1 tablet (100 mg total) by mouth daily as needed. 6 tablet 2    spironolactone (ALDACTONE) 25 MG tablet Take 1 tablet (25 mg total) by mouth once daily. 90 tablet 3    tramadol (ULTRAM-ER) 300 MG Tb24 Take 300 mg by mouth once daily.      albuterol (PROAIR HFA) 90 mcg/actuation inhaler Inhale 2 puffs into the lungs every 6 (six) hours as needed for Wheezing. Rescue 18 g 0    [DISCONTINUED] DULoxetine (CYMBALTA) 60 MG capsule Take 1 capsule (60 mg total) by mouth once daily. 90 capsule 2     No current facility-administered medications on file prior to visit.       Vitals:    12/05/22 1538   BP: 128/76   Pulse: 82       Physical Exam:    Physical Exam  Constitutional:       Appearance: He is well-developed.   HENT:      Head: Normocephalic.      Right Ear: Hearing normal.      Left Ear: Hearing normal.      Nose: No rhinorrhea.      Mouth/Throat:      Pharynx: Uvula midline.   Eyes:      Conjunctiva/sclera: Conjunctivae normal.      Pupils: Pupils are equal, round, and reactive to light.   Cardiovascular:      Rate and Rhythm: Normal rate and regular rhythm.      Heart sounds: Normal heart sounds.   Pulmonary:      Effort: Pulmonary effort is normal.      Breath  sounds: Normal breath sounds.   Musculoskeletal:      Right shoulder: No swelling or tenderness. Normal range of motion.      Left shoulder: No swelling or tenderness. Normal range of motion.      Right wrist: No swelling or tenderness. Normal range of motion.      Left wrist: No swelling or tenderness. Normal range of motion.      Right hand: Swelling and tenderness present. Normal range of motion.      Left hand: Tenderness present. No swelling. Decreased range of motion.      Right knee: No swelling. Normal range of motion. No tenderness.      Left knee: No swelling. Normal range of motion. No tenderness.      Right ankle: No swelling. No tenderness. Normal range of motion.      Left ankle: No swelling. No tenderness. Normal range of motion.      Right foot: Normal range of motion. No swelling or tenderness.      Left foot: Normal range of motion. No swelling or tenderness.      Comments: ruight rheum nodule on elbow. No flexion contracture. Right 2,3 MCP with swelling.    Skin:     General: Skin is warm and dry.   Neurological:      Mental Status: He is oriented to person, place, and time.   Psychiatric:         Speech: Speech normal.         Behavior: Behavior normal.             Assessment:       Encounter Diagnoses   Name Primary?    Rheumatoid arthritis involving multiple sites with positive rheumatoid factor Yes    Immunosuppression           Plan:        Rheumatoid arthritis involving multiple sites with positive rheumatoid factor  -     CBC Auto Differential; Standing; Expected date: 12/05/2022  -     Comprehensive Metabolic Panel; Standing; Expected date: 12/05/2022  -     Sedimentation rate; Standing; Expected date: 12/05/2022  -     C-Reactive Protein; Standing; Expected date: 12/05/2022    Immunosuppression  -     CBC Auto Differential; Standing; Expected date: 12/05/2022  -     Comprehensive Metabolic Panel; Standing; Expected date: 12/05/2022  -     Sedimentation rate; Standing; Expected date:  12/05/2022  -     C-Reactive Protein; Standing; Expected date: 12/05/2022    Continue MTX at 9 tabs weekly   folic acid 1mg daily.   HCQ 200mg once daily  Will need eye exam recent 2021 with Dr. Mcguire.   Labs printed to be done now and in 3 months. At [place].       No follow-ups on file.        30min consultation with greater than 50% spent in counseling, chart review and coordination of care. All questions answered.    Thank you for allowing me to participate in the care of this very pleasant patient.

## 2023-05-08 ENCOUNTER — OFFICE VISIT (OUTPATIENT)
Dept: RHEUMATOLOGY | Facility: CLINIC | Age: 68
End: 2023-05-08
Payer: MEDICARE

## 2023-05-08 VITALS
DIASTOLIC BLOOD PRESSURE: 78 MMHG | HEART RATE: 76 BPM | SYSTOLIC BLOOD PRESSURE: 146 MMHG | BODY MASS INDEX: 35.78 KG/M2 | HEIGHT: 73 IN | WEIGHT: 269.94 LBS

## 2023-05-08 DIAGNOSIS — M05.79 RHEUMATOID ARTHRITIS INVOLVING MULTIPLE SITES WITH POSITIVE RHEUMATOID FACTOR: Primary | ICD-10-CM

## 2023-05-08 DIAGNOSIS — Z79.899 HIGH RISK MEDICATIONS (NOT ANTICOAGULANTS) LONG-TERM USE: ICD-10-CM

## 2023-05-08 DIAGNOSIS — Z95.810 BIVENTRICULAR ICD (IMPLANTABLE CARDIOVERTER-DEFIBRILLATOR) IN PLACE: ICD-10-CM

## 2023-05-08 DIAGNOSIS — D84.9 IMMUNOSUPPRESSION: ICD-10-CM

## 2023-05-08 PROCEDURE — 1125F PR PAIN SEVERITY QUANTIFIED, PAIN PRESENT: ICD-10-PCS | Mod: CPTII,S$GLB,, | Performed by: INTERNAL MEDICINE

## 2023-05-08 PROCEDURE — 3077F PR MOST RECENT SYSTOLIC BLOOD PRESSURE >= 140 MM HG: ICD-10-PCS | Mod: CPTII,S$GLB,, | Performed by: INTERNAL MEDICINE

## 2023-05-08 PROCEDURE — 3078F PR MOST RECENT DIASTOLIC BLOOD PRESSURE < 80 MM HG: ICD-10-PCS | Mod: CPTII,S$GLB,, | Performed by: INTERNAL MEDICINE

## 2023-05-08 PROCEDURE — 3061F NEG MICROALBUMINURIA REV: CPT | Mod: CPTII,S$GLB,, | Performed by: INTERNAL MEDICINE

## 2023-05-08 PROCEDURE — 99999 PR PBB SHADOW E&M-EST. PATIENT-LVL III: CPT | Mod: PBBFAC,,, | Performed by: INTERNAL MEDICINE

## 2023-05-08 PROCEDURE — 3066F PR DOCUMENTATION OF TREATMENT FOR NEPHROPATHY: ICD-10-PCS | Mod: CPTII,S$GLB,, | Performed by: INTERNAL MEDICINE

## 2023-05-08 PROCEDURE — 99999 PR PBB SHADOW E&M-EST. PATIENT-LVL III: ICD-10-PCS | Mod: PBBFAC,,, | Performed by: INTERNAL MEDICINE

## 2023-05-08 PROCEDURE — 99214 OFFICE O/P EST MOD 30 MIN: CPT | Mod: S$GLB,,, | Performed by: INTERNAL MEDICINE

## 2023-05-08 PROCEDURE — 3061F PR NEG MICROALBUMINURIA RESULT DOCUMENTED/REVIEW: ICD-10-PCS | Mod: CPTII,S$GLB,, | Performed by: INTERNAL MEDICINE

## 2023-05-08 PROCEDURE — 3008F PR BODY MASS INDEX (BMI) DOCUMENTED: ICD-10-PCS | Mod: CPTII,S$GLB,, | Performed by: INTERNAL MEDICINE

## 2023-05-08 PROCEDURE — 4010F ACE/ARB THERAPY RXD/TAKEN: CPT | Mod: CPTII,S$GLB,, | Performed by: INTERNAL MEDICINE

## 2023-05-08 PROCEDURE — 3044F PR MOST RECENT HEMOGLOBIN A1C LEVEL <7.0%: ICD-10-PCS | Mod: CPTII,S$GLB,, | Performed by: INTERNAL MEDICINE

## 2023-05-08 PROCEDURE — 1125F AMNT PAIN NOTED PAIN PRSNT: CPT | Mod: CPTII,S$GLB,, | Performed by: INTERNAL MEDICINE

## 2023-05-08 PROCEDURE — 3066F NEPHROPATHY DOC TX: CPT | Mod: CPTII,S$GLB,, | Performed by: INTERNAL MEDICINE

## 2023-05-08 PROCEDURE — 3044F HG A1C LEVEL LT 7.0%: CPT | Mod: CPTII,S$GLB,, | Performed by: INTERNAL MEDICINE

## 2023-05-08 PROCEDURE — 3078F DIAST BP <80 MM HG: CPT | Mod: CPTII,S$GLB,, | Performed by: INTERNAL MEDICINE

## 2023-05-08 PROCEDURE — 4010F PR ACE/ARB THEARPY RXD/TAKEN: ICD-10-PCS | Mod: CPTII,S$GLB,, | Performed by: INTERNAL MEDICINE

## 2023-05-08 PROCEDURE — 3077F SYST BP >= 140 MM HG: CPT | Mod: CPTII,S$GLB,, | Performed by: INTERNAL MEDICINE

## 2023-05-08 PROCEDURE — 99214 PR OFFICE/OUTPT VISIT, EST, LEVL IV, 30-39 MIN: ICD-10-PCS | Mod: S$GLB,,, | Performed by: INTERNAL MEDICINE

## 2023-05-08 PROCEDURE — 3008F BODY MASS INDEX DOCD: CPT | Mod: CPTII,S$GLB,, | Performed by: INTERNAL MEDICINE

## 2023-05-08 ASSESSMENT — ROUTINE ASSESSMENT OF PATIENT INDEX DATA (RAPID3)
PAIN SCORE: 6.5
FATIGUE SCORE: 1.1
TOTAL RAPID3 SCORE: 3.17
PATIENT GLOBAL ASSESSMENT SCORE: 3
MDHAQ FUNCTION SCORE: 0
PSYCHOLOGICAL DISTRESS SCORE: 0

## 2023-06-27 DIAGNOSIS — M05.79 RHEUMATOID ARTHRITIS INVOLVING MULTIPLE SITES WITH POSITIVE RHEUMATOID FACTOR: ICD-10-CM

## 2023-06-27 DIAGNOSIS — D84.9 IMMUNOSUPPRESSION: ICD-10-CM

## 2023-06-27 RX ORDER — FOLIC ACID 1 MG/1
TABLET ORAL
Qty: 90 TABLET | Refills: 3 | Status: SHIPPED | OUTPATIENT
Start: 2023-06-27

## 2023-08-22 DIAGNOSIS — D84.9 IMMUNOSUPPRESSION: ICD-10-CM

## 2023-08-22 DIAGNOSIS — M05.79 RHEUMATOID ARTHRITIS INVOLVING MULTIPLE SITES WITH POSITIVE RHEUMATOID FACTOR: ICD-10-CM

## 2023-08-23 RX ORDER — METHOTREXATE 2.5 MG/1
TABLET ORAL
Qty: 108 TABLET | Refills: 3 | Status: SHIPPED | OUTPATIENT
Start: 2023-08-23

## 2023-10-09 ENCOUNTER — OFFICE VISIT (OUTPATIENT)
Dept: RHEUMATOLOGY | Facility: CLINIC | Age: 68
End: 2023-10-09
Payer: MEDICARE

## 2023-10-09 VITALS
DIASTOLIC BLOOD PRESSURE: 72 MMHG | HEART RATE: 73 BPM | BODY MASS INDEX: 35.2 KG/M2 | WEIGHT: 265.56 LBS | SYSTOLIC BLOOD PRESSURE: 135 MMHG | HEIGHT: 73 IN

## 2023-10-09 DIAGNOSIS — D84.9 IMMUNOSUPPRESSION: ICD-10-CM

## 2023-10-09 DIAGNOSIS — M05.79 RHEUMATOID ARTHRITIS INVOLVING MULTIPLE SITES WITH POSITIVE RHEUMATOID FACTOR: Primary | ICD-10-CM

## 2023-10-09 DIAGNOSIS — Z79.899 HIGH RISK MEDICATIONS (NOT ANTICOAGULANTS) LONG-TERM USE: ICD-10-CM

## 2023-10-09 PROCEDURE — 99999 PR PBB SHADOW E&M-EST. PATIENT-LVL IV: CPT | Mod: PBBFAC,,, | Performed by: INTERNAL MEDICINE

## 2023-10-09 PROCEDURE — 99999 PR PBB SHADOW E&M-EST. PATIENT-LVL IV: ICD-10-PCS | Mod: PBBFAC,,, | Performed by: INTERNAL MEDICINE

## 2023-10-09 PROCEDURE — 3061F PR NEG MICROALBUMINURIA RESULT DOCUMENTED/REVIEW: ICD-10-PCS | Mod: CPTII,S$GLB,, | Performed by: INTERNAL MEDICINE

## 2023-10-09 PROCEDURE — 1101F PT FALLS ASSESS-DOCD LE1/YR: CPT | Mod: CPTII,S$GLB,, | Performed by: INTERNAL MEDICINE

## 2023-10-09 PROCEDURE — 3075F SYST BP GE 130 - 139MM HG: CPT | Mod: CPTII,S$GLB,, | Performed by: INTERNAL MEDICINE

## 2023-10-09 PROCEDURE — 1101F PR PT FALLS ASSESS DOC 0-1 FALLS W/OUT INJ PAST YR: ICD-10-PCS | Mod: CPTII,S$GLB,, | Performed by: INTERNAL MEDICINE

## 2023-10-09 PROCEDURE — 3066F NEPHROPATHY DOC TX: CPT | Mod: CPTII,S$GLB,, | Performed by: INTERNAL MEDICINE

## 2023-10-09 PROCEDURE — 1125F PR PAIN SEVERITY QUANTIFIED, PAIN PRESENT: ICD-10-PCS | Mod: CPTII,S$GLB,, | Performed by: INTERNAL MEDICINE

## 2023-10-09 PROCEDURE — 3288F PR FALLS RISK ASSESSMENT DOCUMENTED: ICD-10-PCS | Mod: CPTII,S$GLB,, | Performed by: INTERNAL MEDICINE

## 2023-10-09 PROCEDURE — 3061F NEG MICROALBUMINURIA REV: CPT | Mod: CPTII,S$GLB,, | Performed by: INTERNAL MEDICINE

## 2023-10-09 PROCEDURE — 1125F AMNT PAIN NOTED PAIN PRSNT: CPT | Mod: CPTII,S$GLB,, | Performed by: INTERNAL MEDICINE

## 2023-10-09 PROCEDURE — 3075F PR MOST RECENT SYSTOLIC BLOOD PRESS GE 130-139MM HG: ICD-10-PCS | Mod: CPTII,S$GLB,, | Performed by: INTERNAL MEDICINE

## 2023-10-09 PROCEDURE — 3066F PR DOCUMENTATION OF TREATMENT FOR NEPHROPATHY: ICD-10-PCS | Mod: CPTII,S$GLB,, | Performed by: INTERNAL MEDICINE

## 2023-10-09 PROCEDURE — 3044F PR MOST RECENT HEMOGLOBIN A1C LEVEL <7.0%: ICD-10-PCS | Mod: CPTII,S$GLB,, | Performed by: INTERNAL MEDICINE

## 2023-10-09 PROCEDURE — 4010F ACE/ARB THERAPY RXD/TAKEN: CPT | Mod: CPTII,S$GLB,, | Performed by: INTERNAL MEDICINE

## 2023-10-09 PROCEDURE — 3078F DIAST BP <80 MM HG: CPT | Mod: CPTII,S$GLB,, | Performed by: INTERNAL MEDICINE

## 2023-10-09 PROCEDURE — 3008F PR BODY MASS INDEX (BMI) DOCUMENTED: ICD-10-PCS | Mod: CPTII,S$GLB,, | Performed by: INTERNAL MEDICINE

## 2023-10-09 PROCEDURE — 4010F PR ACE/ARB THEARPY RXD/TAKEN: ICD-10-PCS | Mod: CPTII,S$GLB,, | Performed by: INTERNAL MEDICINE

## 2023-10-09 PROCEDURE — 99214 PR OFFICE/OUTPT VISIT, EST, LEVL IV, 30-39 MIN: ICD-10-PCS | Mod: S$GLB,,, | Performed by: INTERNAL MEDICINE

## 2023-10-09 PROCEDURE — 3078F PR MOST RECENT DIASTOLIC BLOOD PRESSURE < 80 MM HG: ICD-10-PCS | Mod: CPTII,S$GLB,, | Performed by: INTERNAL MEDICINE

## 2023-10-09 PROCEDURE — 3044F HG A1C LEVEL LT 7.0%: CPT | Mod: CPTII,S$GLB,, | Performed by: INTERNAL MEDICINE

## 2023-10-09 PROCEDURE — 99214 OFFICE O/P EST MOD 30 MIN: CPT | Mod: S$GLB,,, | Performed by: INTERNAL MEDICINE

## 2023-10-09 PROCEDURE — 3008F BODY MASS INDEX DOCD: CPT | Mod: CPTII,S$GLB,, | Performed by: INTERNAL MEDICINE

## 2023-10-09 PROCEDURE — 3288F FALL RISK ASSESSMENT DOCD: CPT | Mod: CPTII,S$GLB,, | Performed by: INTERNAL MEDICINE

## 2023-10-09 ASSESSMENT — ROUTINE ASSESSMENT OF PATIENT INDEX DATA (RAPID3)
PSYCHOLOGICAL DISTRESS SCORE: 0
PATIENT GLOBAL ASSESSMENT SCORE: 5
FATIGUE SCORE: 1.1
TOTAL RAPID3 SCORE: 3.44
MDHAQ FUNCTION SCORE: 0.1
PAIN SCORE: 5

## 2023-10-09 NOTE — PROGRESS NOTES
Subjective:          Chief Complaint: Jorge Mckeon is a 68 y.o. male who had concerns including Disease Management.    HPI:    Pt is a 68-year-old gentleman with seropositive rheumatoid arthritis   Has a history of renal cell carcinoma s/p partial nephrectomy    Pain 3/10 SOTERO: 0.7.    Low back stiffness chronic intermittent w/o radiculopathy.   Intermittent swelling MCP, + AM stiffness about 30 minutes.     +Rheum nodules.   He is currently on  methotrexate 9 tablets weekly   Folic acid.   HCQ 200mg  -Dr. Levi -2019.   Prednisone 5mg daily did not take b/c concern over BS. Raises blood sugar.   Previously: Enbrel, Orencia, Remicade, Actemra, Simponi, Cimzia and best overall was Humira with longest success. never RTX.   Never LFA  No other malignancies.   Patient follows with Dr. Chandra for pain management. previous RFA lumbar spine see MRI in [facility]. S/p procedures. Currently with HEP and this is helping.        REVIEW OF SYSTEMS:    Review of Systems   Constitutional:  Negative for fever, malaise/fatigue and weight loss.   HENT:  Negative for sore throat.    Eyes:  Negative for double vision, photophobia and redness.   Respiratory:  Negative for cough, shortness of breath and wheezing.    Cardiovascular:  Negative for chest pain, palpitations and orthopnea.   Gastrointestinal:  Negative for abdominal pain, constipation and diarrhea.   Genitourinary:  Negative for dysuria, hematuria and urgency.   Musculoskeletal:  Positive for joint pain. Negative for back pain and myalgias.   Skin:  Negative for rash.   Neurological:  Negative for dizziness, tingling, focal weakness and headaches.   Endo/Heme/Allergies:  Does not bruise/bleed easily.   Psychiatric/Behavioral:  Negative for depression, hallucinations and suicidal ideas.                Objective:            Past Medical History:   Diagnosis Date    Anticoagulant long-term use     Cancer     renal CA    CHF (congestive heart failure)     Coronary artery disease      Current smoker 2015    Diabetes     IDDM    Encounter for blood transfusion     GERD (gastroesophageal reflux disease)     H/O CHF     Hyperlipidemia     stopped cholesterol meds currently    Hypertension     Immunosuppression     Methotrexate for 2 years for RA    Rheumatoid arthritis      Family History   Problem Relation Age of Onset    Cancer Mother     Diabetes Mother     Heart disease Father      Social History     Tobacco Use    Smoking status: Former     Current packs/day: 0.00     Average packs/day: 1.5 packs/day for 51.1 years (76.6 ttl pk-yrs)     Types: Cigarettes     Start date:      Quit date: 2022     Years since quittin.6    Smokeless tobacco: Never    Tobacco comments:     5 A DAY   Substance Use Topics    Alcohol use: No    Drug use: No         Meds reviewed with patient.  but due to error in the Rx that contains  asterisk Epic will not allow me to include this in my document nor close my chart.       Vitals:    10/09/23 1102   BP: 135/72   Pulse: 73       Physical Exam:    Physical Exam  Constitutional:       Appearance: He is well-developed.   HENT:      Head: Normocephalic.      Right Ear: Hearing normal.      Left Ear: Hearing normal.      Nose: No rhinorrhea.      Mouth/Throat:      Pharynx: Uvula midline.   Eyes:      Conjunctiva/sclera: Conjunctivae normal.      Pupils: Pupils are equal, round, and reactive to light.   Cardiovascular:      Rate and Rhythm: Normal rate and regular rhythm.      Heart sounds: Normal heart sounds.   Pulmonary:      Effort: Pulmonary effort is normal.      Breath sounds: Normal breath sounds.   Musculoskeletal:      Right shoulder: No swelling or tenderness. Normal range of motion.      Left shoulder: No swelling or tenderness. Normal range of motion.      Right wrist: No swelling or tenderness. Normal range of motion.      Left wrist: No swelling or tenderness. Normal range of motion.      Right hand: Swelling and tenderness present. Normal  range of motion.      Left hand: Tenderness present. No swelling. Decreased range of motion.      Right knee: No swelling. Normal range of motion. No tenderness.      Left knee: No swelling. Normal range of motion. No tenderness.      Right ankle: No swelling. No tenderness. Normal range of motion.      Left ankle: No swelling. No tenderness. Normal range of motion.      Right foot: Normal range of motion. No swelling or tenderness.      Left foot: Normal range of motion. No swelling or tenderness.      Comments: ruight rheum nodule on elbow. No flexion contracture. Right 2,3 MCP with swelling.    Skin:     General: Skin is warm and dry.   Neurological:      Mental Status: He is oriented to person, place, and time.   Psychiatric:         Speech: Speech normal.         Behavior: Behavior normal.               Assessment:       Encounter Diagnoses   Name Primary?    Rheumatoid arthritis involving multiple sites with positive rheumatoid factor Yes    Immunosuppression     High risk medications (not anticoagulants) long-term use           Plan:        Rheumatoid arthritis involving multiple sites with positive rheumatoid factor  -     Cancel: CBC Auto Differential; Standing; Expected date: 10/09/2023  -     Cancel: Comprehensive Metabolic Panel; Standing; Expected date: 10/09/2023  -     Cancel: Sedimentation rate; Standing; Expected date: 10/09/2023  -     Cancel: C-Reactive Protein; Standing; Expected date: 10/09/2023  -     Comprehensive Metabolic Panel; Standing; Expected date: 10/09/2023  -     CBC Auto Differential; Standing; Expected date: 10/09/2023  -     Sedimentation rate; Standing; Expected date: 10/09/2023  -     C-Reactive Protein; Standing; Expected date: 10/09/2023    Immunosuppression  -     Comprehensive Metabolic Panel; Standing; Expected date: 10/09/2023  -     CBC Auto Differential; Standing; Expected date: 10/09/2023  -     Sedimentation rate; Standing; Expected date: 10/09/2023  -     C-Reactive  Protein; Standing; Expected date: 10/09/2023    High risk medications (not anticoagulants) long-term use  -     Cancel: CBC Auto Differential; Standing; Expected date: 10/09/2023  -     Cancel: Comprehensive Metabolic Panel; Standing; Expected date: 10/09/2023  -     Cancel: Sedimentation rate; Standing; Expected date: 10/09/2023  -     Cancel: C-Reactive Protein; Standing; Expected date: 10/09/2023  -     Comprehensive Metabolic Panel; Standing; Expected date: 10/09/2023  -     CBC Auto Differential; Standing; Expected date: 10/09/2023  -     Sedimentation rate; Standing; Expected date: 10/09/2023  -     C-Reactive Protein; Standing; Expected date: 10/09/2023      Continue MTX at 9 tabs weekly  Folic acid 1mg daily.   HCQ 200mg once daily off for >1 year with no flare.   Will need eye exam recent 2023   Labs printed to be done now and in 3 months. At [facility].       Follow up in about 5 months (around 3/9/2024).        30min consultation with greater than 50% spent in counseling, chart review and coordination of care. All questions answered.    Thank you for allowing me to participate in the care of this very pleasant patient.

## 2024-03-11 ENCOUNTER — OFFICE VISIT (OUTPATIENT)
Dept: RHEUMATOLOGY | Facility: CLINIC | Age: 69
End: 2024-03-11
Payer: MEDICARE

## 2024-03-11 VITALS
SYSTOLIC BLOOD PRESSURE: 154 MMHG | BODY MASS INDEX: 35.33 KG/M2 | DIASTOLIC BLOOD PRESSURE: 71 MMHG | HEART RATE: 85 BPM | WEIGHT: 266.56 LBS | HEIGHT: 73 IN

## 2024-03-11 DIAGNOSIS — D84.9 IMMUNOSUPPRESSION: ICD-10-CM

## 2024-03-11 DIAGNOSIS — M05.79 RHEUMATOID ARTHRITIS INVOLVING MULTIPLE SITES WITH POSITIVE RHEUMATOID FACTOR: Primary | ICD-10-CM

## 2024-03-11 DIAGNOSIS — Z79.899 HIGH RISK MEDICATIONS (NOT ANTICOAGULANTS) LONG-TERM USE: ICD-10-CM

## 2024-03-11 PROCEDURE — 1101F PT FALLS ASSESS-DOCD LE1/YR: CPT | Mod: CPTII,S$GLB,, | Performed by: INTERNAL MEDICINE

## 2024-03-11 PROCEDURE — 3077F SYST BP >= 140 MM HG: CPT | Mod: CPTII,S$GLB,, | Performed by: INTERNAL MEDICINE

## 2024-03-11 PROCEDURE — 3288F FALL RISK ASSESSMENT DOCD: CPT | Mod: CPTII,S$GLB,, | Performed by: INTERNAL MEDICINE

## 2024-03-11 PROCEDURE — 3078F DIAST BP <80 MM HG: CPT | Mod: CPTII,S$GLB,, | Performed by: INTERNAL MEDICINE

## 2024-03-11 PROCEDURE — 99999 PR PBB SHADOW E&M-EST. PATIENT-LVL III: CPT | Mod: PBBFAC,,, | Performed by: INTERNAL MEDICINE

## 2024-03-11 PROCEDURE — 3008F BODY MASS INDEX DOCD: CPT | Mod: CPTII,S$GLB,, | Performed by: INTERNAL MEDICINE

## 2024-03-11 PROCEDURE — 4010F ACE/ARB THERAPY RXD/TAKEN: CPT | Mod: CPTII,S$GLB,, | Performed by: INTERNAL MEDICINE

## 2024-03-11 PROCEDURE — 99214 OFFICE O/P EST MOD 30 MIN: CPT | Mod: S$GLB,,, | Performed by: INTERNAL MEDICINE

## 2024-03-11 PROCEDURE — 1125F AMNT PAIN NOTED PAIN PRSNT: CPT | Mod: CPTII,S$GLB,, | Performed by: INTERNAL MEDICINE

## 2024-03-11 PROCEDURE — 1159F MED LIST DOCD IN RCRD: CPT | Mod: CPTII,S$GLB,, | Performed by: INTERNAL MEDICINE

## 2024-03-11 ASSESSMENT — ROUTINE ASSESSMENT OF PATIENT INDEX DATA (RAPID3)
PATIENT GLOBAL ASSESSMENT SCORE: 1.5
MDHAQ FUNCTION SCORE: 0
TOTAL RAPID3 SCORE: 3
FATIGUE SCORE: 1.1
PSYCHOLOGICAL DISTRESS SCORE: 0
PAIN SCORE: 7.5

## 2024-03-11 NOTE — PROGRESS NOTES
Subjective:          Chief Complaint: Jorge Mckeon is a 68 y.o. male who had concerns including Disease Management.    HPI:    Pt is a 68-year-old gentleman with seropositive rheumatoid arthritis   Has a history of renal cell carcinoma s/p partial nephrectomy    Pain 3/10 SOTERO: 0.7.    Low back stiffness chronic intermittent w/o radiculopathy.   Intermittent swelling MCP, + AM stiffness about 30 minutes.     +Rheum nodules.   He is currently on  methotrexate 9 tablets weekly   Folic acid.   HCQ 200mg  -Dr. Levi -2019.   Prednisone 5mg daily did not take b/c concern over BS. Raises blood sugar.   Previously: Enbrel, Orencia, Remicade, Actemra, Simponi, Cimzia and best overall was Humira with longest success. never RTX.   Never LFA  No other malignancies.   Patient follows with Dr. Chandra for pain management. previous RFA lumbar spine see MRI in [facility]. S/p procedures. Currently with HEP and this is helping.        REVIEW OF SYSTEMS:    Review of Systems   Constitutional:  Negative for fever, malaise/fatigue and weight loss.   HENT:  Negative for sore throat.    Eyes:  Negative for double vision, photophobia and redness.   Respiratory:  Negative for cough, shortness of breath and wheezing.    Cardiovascular:  Negative for chest pain, palpitations and orthopnea.   Gastrointestinal:  Negative for abdominal pain, constipation and diarrhea.   Genitourinary:  Negative for dysuria, hematuria and urgency.   Musculoskeletal:  Positive for joint pain. Negative for back pain and myalgias.   Skin:  Negative for rash.   Neurological:  Negative for dizziness, tingling, focal weakness and headaches.   Endo/Heme/Allergies:  Does not bruise/bleed easily.   Psychiatric/Behavioral:  Negative for depression, hallucinations and suicidal ideas.                Objective:            Past Medical History:   Diagnosis Date    Anticoagulant long-term use     Cancer     renal CA    CHF (congestive heart failure)     Coronary artery disease      Current smoker 2015    Diabetes     IDDM    Encounter for blood transfusion     GERD (gastroesophageal reflux disease)     H/O CHF     Hyperlipidemia     stopped cholesterol meds currently    Hypertension     Immunosuppression     Methotrexate for 2 years for RA    Rheumatoid arthritis      Family History   Problem Relation Age of Onset    Cancer Mother     Diabetes Mother     Heart disease Father      Social History     Tobacco Use    Smoking status: Former     Current packs/day: 0.00     Average packs/day: 1.5 packs/day for 51.1 years (76.6 ttl pk-yrs)     Types: Cigarettes     Start date:      Quit date: 2022     Years since quittin.1    Smokeless tobacco: Never    Tobacco comments:     5 A DAY   Substance Use Topics    Alcohol use: No    Drug use: No         Meds reviewed with patient.  but due to error in the Rx that contains  asterisk Epic will not allow me to include this in my document nor close my chart.       Vitals:    24 1111   BP: (!) 154/71   Pulse: 85       Physical Exam:    Physical Exam  Constitutional:       Appearance: He is well-developed.   HENT:      Head: Normocephalic.      Right Ear: Hearing normal.      Left Ear: Hearing normal.      Nose: No rhinorrhea.      Mouth/Throat:      Pharynx: Uvula midline.   Eyes:      Conjunctiva/sclera: Conjunctivae normal.      Pupils: Pupils are equal, round, and reactive to light.   Cardiovascular:      Rate and Rhythm: Normal rate and regular rhythm.      Heart sounds: Normal heart sounds.   Pulmonary:      Effort: Pulmonary effort is normal.      Breath sounds: Normal breath sounds.   Musculoskeletal:      Right shoulder: No swelling or tenderness. Normal range of motion.      Left shoulder: No swelling or tenderness. Normal range of motion.      Right wrist: No swelling or tenderness. Normal range of motion.      Left wrist: No swelling or tenderness. Normal range of motion.      Right hand: Swelling and tenderness present. Normal  range of motion.      Left hand: Tenderness present. No swelling. Decreased range of motion.      Right knee: No swelling. Normal range of motion. No tenderness.      Left knee: No swelling. Normal range of motion. No tenderness.      Right ankle: No swelling. No tenderness. Normal range of motion.      Left ankle: No swelling. No tenderness. Normal range of motion.      Right foot: Normal range of motion. No swelling or tenderness.      Left foot: Normal range of motion. No swelling or tenderness.      Comments: ruight rheum nodule on elbow. No flexion contracture. Right 2,3 MCP with swelling.    Skin:     General: Skin is warm and dry.   Neurological:      Mental Status: He is oriented to person, place, and time.   Psychiatric:         Speech: Speech normal.         Behavior: Behavior normal.             Assessment:       Encounter Diagnoses   Name Primary?    Rheumatoid arthritis involving multiple sites with positive rheumatoid factor Yes    Immunosuppression           Plan:        Rheumatoid arthritis involving multiple sites with positive rheumatoid factor  -     CBC Auto Differential; Standing; Expected date: 03/11/2024  -     Comprehensive Metabolic Panel; Standing; Expected date: 03/11/2024  -     Sedimentation rate; Standing; Expected date: 03/11/2024  -     C-Reactive Protein; Standing; Expected date: 03/11/2024    Immunosuppression  -     CBC Auto Differential; Standing; Expected date: 03/11/2024  -     Comprehensive Metabolic Panel; Standing; Expected date: 03/11/2024  -     Sedimentation rate; Standing; Expected date: 03/11/2024  -     C-Reactive Protein; Standing; Expected date: 03/11/2024    High risk medications (not anticoagulants) long-term use  -     CBC Auto Differential; Standing; Expected date: 03/11/2024  -     Comprehensive Metabolic Panel; Standing; Expected date: 03/11/2024  -     Sedimentation rate; Standing; Expected date: 03/11/2024  -     C-Reactive Protein; Standing; Expected date:  03/11/2024      Continue MTX at 9 tabs weekly  Folic acid 1mg daily.   HCQ 200mg once daily off for >1 year with no flare.   Will need eye exam recent 2023         Follow up in about 6 months (around 9/11/2024).        30min consultation with greater than 50% spent in counseling, chart review and coordination of care. All questions answered.    Thank you for allowing me to participate in the care of this very pleasant patient.

## 2024-03-28 PROBLEM — I50.32 CHRONIC DIASTOLIC CONGESTIVE HEART FAILURE: Status: ACTIVE | Noted: 2024-03-28

## 2024-03-28 PROBLEM — I42.0 CONGESTIVE CARDIOMYOPATHY: Status: ACTIVE | Noted: 2024-03-28

## 2024-04-18 ENCOUNTER — TELEPHONE (OUTPATIENT)
Dept: HEMATOLOGY/ONCOLOGY | Facility: CLINIC | Age: 69
End: 2024-04-18
Payer: MEDICARE

## 2024-04-18 NOTE — NURSING
Reached out to patient to schedule PET scan ordered by Dr. Nair.  Offered patient next available of Tuesday afternoon, but since patient has to fast for at least 6 hours he requested a morning appt. Patient accepted 9:45 on Thursday, 4/25.

## 2024-04-18 NOTE — NURSING
Canceled pet scan appt per Dr. Nair; patient would like to go to [facility].

## 2024-04-26 PROBLEM — R91.1 LUNG NODULE: Status: ACTIVE | Noted: 2024-04-26

## 2024-05-28 DIAGNOSIS — U07.1 COVID-19 VIRUS DETECTED: ICD-10-CM

## 2024-06-25 DIAGNOSIS — D84.9 IMMUNOSUPPRESSION: ICD-10-CM

## 2024-06-25 DIAGNOSIS — M05.79 RHEUMATOID ARTHRITIS INVOLVING MULTIPLE SITES WITH POSITIVE RHEUMATOID FACTOR: ICD-10-CM

## 2024-06-27 RX ORDER — FOLIC ACID 1 MG/1
TABLET ORAL
Qty: 90 TABLET | Refills: 3 | Status: SHIPPED | OUTPATIENT
Start: 2024-06-27

## 2024-07-23 DIAGNOSIS — M05.79 RHEUMATOID ARTHRITIS INVOLVING MULTIPLE SITES WITH POSITIVE RHEUMATOID FACTOR: ICD-10-CM

## 2024-07-23 DIAGNOSIS — D84.9 IMMUNOSUPPRESSION: ICD-10-CM

## 2024-07-23 RX ORDER — METHOTREXATE 2.5 MG/1
TABLET ORAL
Qty: 108 TABLET | Refills: 3 | Status: SHIPPED | OUTPATIENT
Start: 2024-07-23

## 2024-09-26 ENCOUNTER — OFFICE VISIT (OUTPATIENT)
Dept: RHEUMATOLOGY | Facility: CLINIC | Age: 69
End: 2024-09-26
Payer: MEDICARE

## 2024-09-26 VITALS
HEIGHT: 72 IN | WEIGHT: 265.44 LBS | SYSTOLIC BLOOD PRESSURE: 187 MMHG | DIASTOLIC BLOOD PRESSURE: 79 MMHG | BODY MASS INDEX: 35.95 KG/M2 | HEART RATE: 79 BPM

## 2024-09-26 DIAGNOSIS — D84.9 IMMUNOSUPPRESSION: ICD-10-CM

## 2024-09-26 DIAGNOSIS — M05.79 RHEUMATOID ARTHRITIS INVOLVING MULTIPLE SITES WITH POSITIVE RHEUMATOID FACTOR: Primary | ICD-10-CM

## 2024-09-26 DIAGNOSIS — Z95.5 S/P DRUG ELUTING CORONARY STENT PLACEMENT: ICD-10-CM

## 2024-09-26 DIAGNOSIS — R91.1 PULMONARY NODULE: ICD-10-CM

## 2024-09-26 DIAGNOSIS — I50.32 CHRONIC DIASTOLIC CONGESTIVE HEART FAILURE: ICD-10-CM

## 2024-09-26 PROCEDURE — 3077F SYST BP >= 140 MM HG: CPT | Mod: CPTII,S$GLB,, | Performed by: INTERNAL MEDICINE

## 2024-09-26 PROCEDURE — 3044F HG A1C LEVEL LT 7.0%: CPT | Mod: CPTII,S$GLB,, | Performed by: INTERNAL MEDICINE

## 2024-09-26 PROCEDURE — 3078F DIAST BP <80 MM HG: CPT | Mod: CPTII,S$GLB,, | Performed by: INTERNAL MEDICINE

## 2024-09-26 PROCEDURE — 99214 OFFICE O/P EST MOD 30 MIN: CPT | Mod: S$GLB,,, | Performed by: INTERNAL MEDICINE

## 2024-09-26 PROCEDURE — 3066F NEPHROPATHY DOC TX: CPT | Mod: CPTII,S$GLB,, | Performed by: INTERNAL MEDICINE

## 2024-09-26 PROCEDURE — 3061F NEG MICROALBUMINURIA REV: CPT | Mod: CPTII,S$GLB,, | Performed by: INTERNAL MEDICINE

## 2024-09-26 PROCEDURE — 4010F ACE/ARB THERAPY RXD/TAKEN: CPT | Mod: CPTII,S$GLB,, | Performed by: INTERNAL MEDICINE

## 2024-09-26 PROCEDURE — 3288F FALL RISK ASSESSMENT DOCD: CPT | Mod: CPTII,S$GLB,, | Performed by: INTERNAL MEDICINE

## 2024-09-26 PROCEDURE — 1125F AMNT PAIN NOTED PAIN PRSNT: CPT | Mod: CPTII,S$GLB,, | Performed by: INTERNAL MEDICINE

## 2024-09-26 PROCEDURE — 1159F MED LIST DOCD IN RCRD: CPT | Mod: CPTII,S$GLB,, | Performed by: INTERNAL MEDICINE

## 2024-09-26 PROCEDURE — 99999 PR PBB SHADOW E&M-EST. PATIENT-LVL IV: CPT | Mod: PBBFAC,,, | Performed by: INTERNAL MEDICINE

## 2024-09-26 PROCEDURE — 3008F BODY MASS INDEX DOCD: CPT | Mod: CPTII,S$GLB,, | Performed by: INTERNAL MEDICINE

## 2024-09-26 PROCEDURE — 1101F PT FALLS ASSESS-DOCD LE1/YR: CPT | Mod: CPTII,S$GLB,, | Performed by: INTERNAL MEDICINE

## 2024-09-26 ASSESSMENT — ROUTINE ASSESSMENT OF PATIENT INDEX DATA (RAPID3)
MDHAQ FUNCTION SCORE: 0.4
FATIGUE SCORE: 1.1
PSYCHOLOGICAL DISTRESS SCORE: 0
PAIN SCORE: 2
TOTAL RAPID3 SCORE: 1.78
PATIENT GLOBAL ASSESSMENT SCORE: 2

## 2024-09-26 NOTE — PROGRESS NOTES
Subjective:          Chief Complaint: Jorge Mckeon is a 69 y.o. male who had concerns including Disease Management.    HPI:    Pt is a 68-year-old gentleman with seropositive rheumatoid arthritis   Has a history of renal cell carcinoma s/p partial nephrectomy  New Pulm. Nodule first bx inconclusive will be having a robotic bx. [facility] soon.     Low back stiffness chronic intermittent w/o radiculopathy. Chronic at baseline.   Intermittent swelling MCP, + AM stiffness about 30 minutes.     +Rheum nodules. Right elbow quite small as recent.     He is currently on  methotrexate 9 tablets weekly   Folic acid.   HCQ 200mg  -Dr. Levi -2019.   Prednisone 5mg daily did not take b/c concern over BS. Raises blood sugar.   Previously: Enbrel, Orencia, Remicade, Actemra, Simponi, Cimzia and best overall was Humira with longest success. never RTX.   Never LFA  No other malignancies.     Patient follows with Dr. Chandra for pain management. previous RFA lumbar spine see MRI in [facility]. S/p procedures. Currently with HEP and this is helping.        REVIEW OF SYSTEMS:    Review of Systems   Constitutional:  Negative for fever, malaise/fatigue and weight loss.   HENT:  Negative for sore throat.    Eyes:  Negative for double vision, photophobia and redness.   Respiratory:  Negative for cough, shortness of breath and wheezing.    Cardiovascular:  Negative for chest pain, palpitations and orthopnea.   Gastrointestinal:  Negative for abdominal pain, constipation and diarrhea.   Genitourinary:  Negative for dysuria, hematuria and urgency.   Musculoskeletal:  Positive for joint pain. Negative for back pain and myalgias.   Skin:  Negative for rash.   Neurological:  Negative for dizziness, tingling, focal weakness and headaches.   Endo/Heme/Allergies:  Does not bruise/bleed easily.   Psychiatric/Behavioral:  Negative for depression, hallucinations and suicidal ideas.                Objective:            Past Medical History:   Diagnosis Date    AICD  (automatic cardioverter/defibrillator) present     Anticoagulant long-term use     Cancer 2000    renal CA ----left kidney    CHF (congestive heart failure)     Coronary artery disease     COVID-19 05/28/2024    Current smoker 08/27/2015    Diabetes     IDDM    Encounter for blood transfusion     GERD (gastroesophageal reflux disease)     H/O CHF     Hyperlipidemia     stopped cholesterol meds currently    Hypertension     Immunosuppression     Methotrexate for 2 years for RA    Rheumatoid arthritis      Family History   Problem Relation Name Age of Onset    Cancer Mother      Diabetes Mother      Heart disease Father       Social History     Tobacco Use    Smoking status: Every Day     Current packs/day: 0.50     Average packs/day: 1.4 packs/day for 53.7 years (77.9 ttl pk-yrs)     Types: Cigarettes     Start date: 1971    Smokeless tobacco: Never    Tobacco comments:     5 A DAY   Substance Use Topics    Alcohol use: No    Drug use: No         Meds reviewed with patient.  but due to error in the Rx that contains  asterisk Epic will not allow me to include this in my document nor close my chart.       Vitals:    09/26/24 1254   BP: (!) 187/79   Pulse: 79       Physical Exam:    Physical Exam  Constitutional:       Appearance: He is well-developed.   HENT:      Head: Normocephalic.      Right Ear: Hearing normal.      Left Ear: Hearing normal.      Nose: No rhinorrhea.      Mouth/Throat:      Pharynx: Uvula midline.   Eyes:      Conjunctiva/sclera: Conjunctivae normal.      Pupils: Pupils are equal, round, and reactive to light.   Cardiovascular:      Rate and Rhythm: Normal rate and regular rhythm.      Heart sounds: Normal heart sounds.   Pulmonary:      Effort: Pulmonary effort is normal.      Breath sounds: Normal breath sounds.   Musculoskeletal:      Right shoulder: No swelling or tenderness. Normal range of motion.      Left shoulder: No swelling or tenderness. Normal range of motion.      Right wrist: No  swelling or tenderness. Normal range of motion.      Left wrist: No swelling or tenderness. Normal range of motion.      Right hand: Swelling and tenderness present. Normal range of motion.      Left hand: Tenderness present. No swelling. Decreased range of motion.      Right knee: No swelling. Normal range of motion. No tenderness.      Left knee: No swelling. Normal range of motion. No tenderness.      Right ankle: No swelling. No tenderness. Normal range of motion.      Left ankle: No swelling. No tenderness. Normal range of motion.      Right foot: Normal range of motion. No swelling or tenderness.      Left foot: Normal range of motion. No swelling or tenderness.      Comments: ruight rheum nodule on elbow. No flexion contracture. Right 2,3 MCP with swelling.    Skin:     General: Skin is warm and dry.   Neurological:      Mental Status: He is oriented to person, place, and time.   Psychiatric:         Speech: Speech normal.         Behavior: Behavior normal.             Assessment:       Encounter Diagnoses   Name Primary?    Rheumatoid arthritis involving multiple sites with positive rheumatoid factor Yes    Immunosuppression     S/P drug eluting coronary stent placement     Chronic diastolic congestive heart failure           Plan:        Rheumatoid arthritis involving multiple sites with positive rheumatoid factor  -     ALT (SGPT); Future; Expected date: 09/26/2024  -     AST (SGOT); Future; Expected date: 09/26/2024  -     CBC Auto Differential; Future; Expected date: 09/26/2024  -     C-Reactive Protein; Future; Expected date: 09/26/2024  -     Creatinine, Serum; Future; Expected date: 09/26/2024  -     Sedimentation rate; Future; Expected date: 09/26/2024    Immunosuppression  -     ALT (SGPT); Future; Expected date: 09/26/2024  -     AST (SGOT); Future; Expected date: 09/26/2024  -     CBC Auto Differential; Future; Expected date: 09/26/2024  -     C-Reactive Protein; Future; Expected date:  09/26/2024  -     Creatinine, Serum; Future; Expected date: 09/26/2024  -     Sedimentation rate; Future; Expected date: 09/26/2024    S/P drug eluting coronary stent placement    Chronic diastolic congestive heart failure    Pulmonary nodule      Continue MTX at 9 tabs weekly  Folic acid 1mg daily.   HCQ 200mg once daily off for >1 year with no flare.     CAD with CHF: stable overall no new events follows with cardiology.     New pulm nodule: pending 2nd bx for better characterization. : hx of Rheum nodule on his elbow, no granulomatous changes noted on initial path.     No follow-ups on file.        30min consultation with greater than 50% spent in counseling, chart review and coordination of care. All questions answered.    Thank you for allowing me to participate in the care of this very pleasant patient.                               [facility]

## 2025-01-29 ENCOUNTER — OFFICE VISIT (OUTPATIENT)
Dept: RHEUMATOLOGY | Facility: CLINIC | Age: 70
End: 2025-01-29
Payer: MEDICARE

## 2025-01-29 VITALS
DIASTOLIC BLOOD PRESSURE: 85 MMHG | WEIGHT: 256.81 LBS | HEART RATE: 83 BPM | HEIGHT: 72 IN | BODY MASS INDEX: 34.78 KG/M2 | SYSTOLIC BLOOD PRESSURE: 138 MMHG

## 2025-01-29 DIAGNOSIS — D84.9 IMMUNOSUPPRESSION: ICD-10-CM

## 2025-01-29 DIAGNOSIS — M05.79 RHEUMATOID ARTHRITIS INVOLVING MULTIPLE SITES WITH POSITIVE RHEUMATOID FACTOR: Primary | ICD-10-CM

## 2025-01-29 PROCEDURE — 3008F BODY MASS INDEX DOCD: CPT | Mod: CPTII,S$GLB,, | Performed by: INTERNAL MEDICINE

## 2025-01-29 PROCEDURE — 1101F PT FALLS ASSESS-DOCD LE1/YR: CPT | Mod: CPTII,S$GLB,, | Performed by: INTERNAL MEDICINE

## 2025-01-29 PROCEDURE — 1125F AMNT PAIN NOTED PAIN PRSNT: CPT | Mod: CPTII,S$GLB,, | Performed by: INTERNAL MEDICINE

## 2025-01-29 PROCEDURE — 3075F SYST BP GE 130 - 139MM HG: CPT | Mod: CPTII,S$GLB,, | Performed by: INTERNAL MEDICINE

## 2025-01-29 PROCEDURE — 99214 OFFICE O/P EST MOD 30 MIN: CPT | Mod: S$GLB,,, | Performed by: INTERNAL MEDICINE

## 2025-01-29 PROCEDURE — 3288F FALL RISK ASSESSMENT DOCD: CPT | Mod: CPTII,S$GLB,, | Performed by: INTERNAL MEDICINE

## 2025-01-29 PROCEDURE — 99999 PR PBB SHADOW E&M-EST. PATIENT-LVL III: CPT | Mod: PBBFAC,,, | Performed by: INTERNAL MEDICINE

## 2025-01-29 PROCEDURE — 1159F MED LIST DOCD IN RCRD: CPT | Mod: CPTII,S$GLB,, | Performed by: INTERNAL MEDICINE

## 2025-01-29 PROCEDURE — 3079F DIAST BP 80-89 MM HG: CPT | Mod: CPTII,S$GLB,, | Performed by: INTERNAL MEDICINE

## 2025-01-29 RX ORDER — METHOTREXATE 2.5 MG/1
TABLET ORAL
Qty: 108 TABLET | Refills: 2 | Status: SHIPPED | OUTPATIENT
Start: 2025-01-29

## 2025-01-29 RX ORDER — FOLIC ACID 1 MG/1
TABLET ORAL
Qty: 90 TABLET | Refills: 3 | Status: SHIPPED | OUTPATIENT
Start: 2025-01-29

## 2025-01-29 ASSESSMENT — ROUTINE ASSESSMENT OF PATIENT INDEX DATA (RAPID3)
PSYCHOLOGICAL DISTRESS SCORE: 0
MDHAQ FUNCTION SCORE: 0.1
TOTAL RAPID3 SCORE: 6.44
FATIGUE SCORE: 1.1
PAIN SCORE: 9.5
PATIENT GLOBAL ASSESSMENT SCORE: 9.5

## 2025-01-29 NOTE — PROGRESS NOTES
Subjective:          Chief Complaint: Jorge Mckeon is a 69 y.o. male who had concerns including Disease Management.    HPI:    Pt is a 69-year-old gentleman with seropositive rheumatoid arthritis   Has a history of renal cell carcinoma s/p partial nephrectomy    Patient with recent lung adeno. Initially scheduled for chemo following resection: plans now to wait.   Underwent PET-CT on December 6, 2024.  PET-CT did not reveal any FDG avidity.  Decision was made to hold chemotherapy and watch and wait with repeat CT in 3 months to in March 2025.   He underwent wedge resection in the left upper lobe for nodule 1 and wedge resection in the left upper lobe for nodule t2 both consistent with adenocarcinoma on October 28, 2024.  Left lower lobe posterior wedge resection of pulmonary nodule also consistent with adenocarcinoma.  Level 5, level 6, level 7, level 8, level 9, level 10, and level 11 lymph nodes all negative for cancer.    Low back stiffness chronic intermittent w/o radiculopathy. Chronic at baseline.   Intermittent swelling MCP, + AM stiffness about 30 minutes.     +Rheum nodules. Right elbow quite small as recent.     He is currently on  methotrexate 9 tablets weekly   Folic acid.   HCQ 200mg  -Dr. Levi -2019.   Prednisone 5mg daily did not take b/c concern over BS. Raises blood sugar.   Previously: Enbrel, Orencia, Remicade, Actemra, Simponi, Cimzia and best overall was Humira with longest success. never RTX.   Never LFA  No other malignancies.     Patient follows with Dr. Chandra for pain management. previous RFA lumbar spine see MRI in [facility]. S/p procedures. Currently with HEP and this is helping.        REVIEW OF SYSTEMS:    Review of Systems   Constitutional:  Negative for fever, malaise/fatigue and weight loss.   HENT:  Negative for sore throat.    Eyes:  Negative for double vision, photophobia and redness.   Respiratory:  Negative for cough, shortness of breath and wheezing.    Cardiovascular:  Negative for  chest pain, palpitations and orthopnea.   Gastrointestinal:  Negative for abdominal pain, constipation and diarrhea.   Genitourinary:  Negative for dysuria, hematuria and urgency.   Musculoskeletal:  Positive for joint pain. Negative for back pain and myalgias.   Skin:  Negative for rash.   Neurological:  Negative for dizziness, tingling, focal weakness and headaches.   Endo/Heme/Allergies:  Does not bruise/bleed easily.   Psychiatric/Behavioral:  Negative for depression, hallucinations and suicidal ideas.                Objective:            Past Medical History:   Diagnosis Date    AICD (automatic cardioverter/defibrillator) present     Anticoagulant long-term use     Cancer 2000    renal CA ----left kidney    CHF (congestive heart failure)     Coronary artery disease     COVID-19 05/28/2024    Current smoker 08/27/2015    Diabetes     IDDM    Encounter for blood transfusion     GERD (gastroesophageal reflux disease)     H/O CHF     Hyperlipidemia     stopped cholesterol meds currently    Hypertension     Immunosuppression     Methotrexate for 2 years for RA    Rheumatoid arthritis      Family History   Problem Relation Name Age of Onset    Cancer Mother      Diabetes Mother      Heart disease Father       Social History     Tobacco Use    Smoking status: Every Day     Current packs/day: 0.50     Average packs/day: 1.4 packs/day for 54.1 years (78.0 ttl pk-yrs)     Types: Cigarettes     Start date: 1971    Smokeless tobacco: Never    Tobacco comments:     5 A DAY   Substance Use Topics    Alcohol use: No    Drug use: No         Meds reviewed with patient.  but due to error in the Rx that contains  asterisk Epic will not allow me to include this in my document nor close my chart.       Vitals:    01/29/25 1317   BP: 138/85   Pulse: 83       Physical Exam:    Physical Exam  Constitutional:       Appearance: He is well-developed.   HENT:      Head: Normocephalic.      Right Ear: Hearing normal.      Left Ear: Hearing  normal.      Nose: No rhinorrhea.      Mouth/Throat:      Pharynx: Uvula midline.   Eyes:      Conjunctiva/sclera: Conjunctivae normal.      Pupils: Pupils are equal, round, and reactive to light.   Cardiovascular:      Rate and Rhythm: Normal rate and regular rhythm.      Heart sounds: Normal heart sounds.   Pulmonary:      Effort: Pulmonary effort is normal.      Breath sounds: Normal breath sounds.   Musculoskeletal:      Right shoulder: No swelling or tenderness. Normal range of motion.      Left shoulder: No swelling or tenderness. Normal range of motion.      Right wrist: No swelling or tenderness. Normal range of motion.      Left wrist: No swelling or tenderness. Normal range of motion.      Right hand: Swelling and tenderness present. Normal range of motion.      Left hand: Tenderness present. No swelling. Decreased range of motion.      Right knee: No swelling. Normal range of motion. No tenderness.      Left knee: No swelling. Normal range of motion. No tenderness.      Right ankle: No swelling. No tenderness. Normal range of motion.      Left ankle: No swelling. No tenderness. Normal range of motion.      Right foot: Normal range of motion. No swelling or tenderness.      Left foot: Normal range of motion. No swelling or tenderness.      Comments: ruight rheum nodule on elbow. No flexion contracture. Right 2,3 MCP with swelling.    Skin:     General: Skin is warm and dry.   Neurological:      Mental Status: He is oriented to person, place, and time.   Psychiatric:         Speech: Speech normal.         Behavior: Behavior normal.               Assessment:       Encounter Diagnoses   Name Primary?    Rheumatoid arthritis involving multiple sites with positive rheumatoid factor Yes    Immunosuppression             Plan:        Rheumatoid arthritis involving multiple sites with positive rheumatoid factor  -     methotrexate 2.5 MG Tab; TAKE 9 TABLETS BY MOUTH EVERY SEVEN DAYS  Dispense: 108 tablet; Refill:  2  -     folic acid (FOLVITE) 1 MG tablet; TAKE ONE TABLET BY MOUTH DAILY  Dispense: 90 tablet; Refill: 3  -     ALT (SGPT); Future; Expected date: 01/29/2025  -     AST (SGOT); Future; Expected date: 01/29/2025  -     CBC Auto Differential; Future; Expected date: 01/29/2025  -     C-Reactive Protein; Future; Expected date: 01/29/2025  -     Creatinine, Serum; Future; Expected date: 01/29/2025  -     Sedimentation rate; Future; Expected date: 01/29/2025    Immunosuppression  -     methotrexate 2.5 MG Tab; TAKE 9 TABLETS BY MOUTH EVERY SEVEN DAYS  Dispense: 108 tablet; Refill: 2  -     folic acid (FOLVITE) 1 MG tablet; TAKE ONE TABLET BY MOUTH DAILY  Dispense: 90 tablet; Refill: 3  -     ALT (SGPT); Future; Expected date: 01/29/2025  -     AST (SGOT); Future; Expected date: 01/29/2025  -     CBC Auto Differential; Future; Expected date: 01/29/2025  -     C-Reactive Protein; Future; Expected date: 01/29/2025  -     Creatinine, Serum; Future; Expected date: 01/29/2025  -     Sedimentation rate; Future; Expected date: 01/29/2025        Continue MTX at 9 tabs weekly  Folic acid 1mg daily.   HCQ 200mg once daily off for >1 year with no flare.     CAD with CHF: stable overall no new events follows with cardiology.     New pulm nodule: pending 2nd bx for better characterization. : hx of Rheum nodule on his elbow, no granulomatous changes noted on initial path.     No follow-ups on file.        30min consultation with greater than 50% spent in counseling, chart review and coordination of care. All questions answered.    Thank you for allowing me to participate in the care of this very pleasant patient.      Notes from Shereen Cronin 10/16/2024 patient is here for new diagnosis adenocarcinoma of the lung hoping that this will be surgical only with no radiation and chemo but as per above he is waiting to meet with another surgeon   Given the increase in size of pulmonary nodule in LLL, decision was made to  proceed with repeat robotic bronchoscopy with biopsy of nodule.  Will refer to Dr. Tang at [facility] for repeat biopsy.  Initial biopsy on 06/13/2024 with results negative for malignancy and only revealing fibrosis with fibrinous exudate.

## 2025-02-20 PROBLEM — Z79.4: Status: ACTIVE | Noted: 2025-02-20

## 2025-02-20 PROBLEM — Z12.5 SCREENING FOR PROSTATE CANCER: Status: ACTIVE | Noted: 2025-02-20

## 2025-02-20 PROBLEM — E11.3293: Status: ACTIVE | Noted: 2025-02-20

## 2025-02-20 PROBLEM — F32.1 CURRENT MODERATE EPISODE OF MAJOR DEPRESSIVE DISORDER WITHOUT PRIOR EPISODE: Status: RESOLVED | Noted: 2018-05-31 | Resolved: 2025-02-20

## 2025-02-20 PROBLEM — C34.32 MALIGNANT NEOPLASM OF LOWER LOBE OF LEFT LUNG: Status: ACTIVE | Noted: 2025-02-20

## 2025-06-11 ENCOUNTER — OFFICE VISIT (OUTPATIENT)
Dept: RHEUMATOLOGY | Facility: CLINIC | Age: 70
End: 2025-06-11
Payer: MEDICARE

## 2025-06-11 VITALS
BODY MASS INDEX: 35.23 KG/M2 | DIASTOLIC BLOOD PRESSURE: 69 MMHG | HEIGHT: 72 IN | HEART RATE: 74 BPM | WEIGHT: 260.13 LBS | SYSTOLIC BLOOD PRESSURE: 149 MMHG

## 2025-06-11 DIAGNOSIS — M05.79 RHEUMATOID ARTHRITIS INVOLVING MULTIPLE SITES WITH POSITIVE RHEUMATOID FACTOR: Primary | ICD-10-CM

## 2025-06-11 DIAGNOSIS — D84.9 IMMUNOSUPPRESSION: ICD-10-CM

## 2025-06-11 DIAGNOSIS — Z79.899 HIGH RISK MEDICATIONS (NOT ANTICOAGULANTS) LONG-TERM USE: ICD-10-CM

## 2025-06-11 PROCEDURE — 99214 OFFICE O/P EST MOD 30 MIN: CPT | Mod: S$GLB,,, | Performed by: INTERNAL MEDICINE

## 2025-06-11 PROCEDURE — 3060F POS MICROALBUMINURIA REV: CPT | Mod: CPTII,S$GLB,, | Performed by: INTERNAL MEDICINE

## 2025-06-11 PROCEDURE — 4010F ACE/ARB THERAPY RXD/TAKEN: CPT | Mod: CPTII,S$GLB,, | Performed by: INTERNAL MEDICINE

## 2025-06-11 PROCEDURE — 3066F NEPHROPATHY DOC TX: CPT | Mod: CPTII,S$GLB,, | Performed by: INTERNAL MEDICINE

## 2025-06-11 PROCEDURE — 99999 PR PBB SHADOW E&M-EST. PATIENT-LVL IV: CPT | Mod: PBBFAC,,, | Performed by: INTERNAL MEDICINE

## 2025-06-11 PROCEDURE — 1125F AMNT PAIN NOTED PAIN PRSNT: CPT | Mod: CPTII,S$GLB,, | Performed by: INTERNAL MEDICINE

## 2025-06-11 PROCEDURE — 3077F SYST BP >= 140 MM HG: CPT | Mod: CPTII,S$GLB,, | Performed by: INTERNAL MEDICINE

## 2025-06-11 PROCEDURE — 1101F PT FALLS ASSESS-DOCD LE1/YR: CPT | Mod: CPTII,S$GLB,, | Performed by: INTERNAL MEDICINE

## 2025-06-11 PROCEDURE — 3008F BODY MASS INDEX DOCD: CPT | Mod: CPTII,S$GLB,, | Performed by: INTERNAL MEDICINE

## 2025-06-11 PROCEDURE — 1159F MED LIST DOCD IN RCRD: CPT | Mod: CPTII,S$GLB,, | Performed by: INTERNAL MEDICINE

## 2025-06-11 PROCEDURE — 3078F DIAST BP <80 MM HG: CPT | Mod: CPTII,S$GLB,, | Performed by: INTERNAL MEDICINE

## 2025-06-11 PROCEDURE — 3288F FALL RISK ASSESSMENT DOCD: CPT | Mod: CPTII,S$GLB,, | Performed by: INTERNAL MEDICINE

## 2025-06-11 ASSESSMENT — ROUTINE ASSESSMENT OF PATIENT INDEX DATA (RAPID3)
FATIGUE SCORE: 1.1
PSYCHOLOGICAL DISTRESS SCORE: 0
PAIN SCORE: 8.5
MDHAQ FUNCTION SCORE: 0.7
TOTAL RAPID3 SCORE: 6.11
PATIENT GLOBAL ASSESSMENT SCORE: 7.5

## 2025-06-11 NOTE — PROGRESS NOTES
Subjective:          Chief Complaint: Jorge Mckeon is a 70 y.o. male who had concerns including Disease Management.    HPI:    Pt is a 69-year-old gentleman with seropositive rheumatoid arthritis   Has a history of renal cell carcinoma s/p partial nephrectomy    Patient with recent lung adeno. Initially scheduled for chemo following resection: plans now to wait.   Underwent PET-CT on December 6, 2024.  PET-CT did not reveal any FDG avidity.  Decision was made to hold chemotherapy and watch and wait with repeat CT in 3 months to in March 2025.   He underwent wedge resection in the left upper lobe for nodule 1 and wedge resection in the left upper lobe for nodule t2 both consistent with adenocarcinoma on October 28, 2024.  Left lower lobe posterior wedge resection of pulmonary nodule also consistent with adenocarcinoma.  Level 5, level 6, level 7, level 8, level 9, level 10, and level 11 lymph nodes all negative for cancer.    Low back stiffness chronic intermittent w/o radiculopathy. Chronic at baseline.   Intermittent swelling MCP, + AM stiffness about 30 minutes.     +Rheum nodules. Right elbow quite small as recent.     He is currently on  methotrexate 9 tablets weekly   Folic acid.   HCQ 200mg  -Dr. Levi -2019.   Prednisone 5mg daily did not take b/c concern over BS. Raises blood sugar.   Previously: Enbrel, Orencia, Remicade, Actemra, Simponi, Cimzia and best overall was Humira with longest success. never RTX.   Never LFA  No other malignancies.     Patient follows with Dr. Chandra for pain management. previous RFA lumbar spine see MRI in [facility]. S/p procedures. Currently with HEP and this is helping.        REVIEW OF SYSTEMS:    Review of Systems   Constitutional:  Negative for fever, malaise/fatigue and weight loss.   HENT:  Negative for sore throat.    Eyes:  Negative for double vision, photophobia and redness.   Respiratory:  Negative for cough, shortness of breath and wheezing.    Cardiovascular:  Negative for  chest pain, palpitations and orthopnea.   Gastrointestinal:  Negative for abdominal pain, constipation and diarrhea.   Genitourinary:  Negative for dysuria, hematuria and urgency.   Musculoskeletal:  Positive for joint pain. Negative for back pain and myalgias.   Skin:  Negative for rash.   Neurological:  Negative for dizziness, tingling, focal weakness and headaches.   Endo/Heme/Allergies:  Does not bruise/bleed easily.   Psychiatric/Behavioral:  Negative for depression, hallucinations and suicidal ideas.                Objective:            Past Medical History:   Diagnosis Date    AICD (automatic cardioverter/defibrillator) present     Anticoagulant long-term use     Cancer 2000    renal CA ----left kidney    CHF (congestive heart failure)     Coronary artery disease     COVID-19 05/28/2024    Current smoker 08/27/2015    Diabetes     IDDM    Encounter for blood transfusion     GERD (gastroesophageal reflux disease)     H/O CHF     Hyperlipidemia     stopped cholesterol meds currently    Hypertension     Immunosuppression     Methotrexate for 2 years for RA    Lung cancer 2024    Rheumatoid arthritis      Family History   Problem Relation Name Age of Onset    Cancer Mother      Diabetes Mother      Heart disease Father       Social History     Tobacco Use    Smoking status: Former     Current packs/day: 0.50     Average packs/day: 1.4 packs/day for 54.4 years (78.2 ttl pk-yrs)     Types: Cigarettes     Start date: 1971    Smokeless tobacco: Never    Tobacco comments:     Pt quit 4 months ago   Substance Use Topics    Alcohol use: No    Drug use: No         Meds reviewed with patient.  but due to error in the Rx that contains  asterisk Epic will not allow me to include this in my document nor close my chart.       Vitals:    06/11/25 1425   BP: (!) 149/69   Pulse: 74       Physical Exam:    Physical Exam  Constitutional:       Appearance: He is well-developed.   HENT:      Head: Normocephalic.      Right Ear:  Hearing normal.      Left Ear: Hearing normal.      Nose: No rhinorrhea.      Mouth/Throat:      Pharynx: Uvula midline.   Eyes:      Conjunctiva/sclera: Conjunctivae normal.      Pupils: Pupils are equal, round, and reactive to light.   Cardiovascular:      Rate and Rhythm: Normal rate and regular rhythm.      Heart sounds: Normal heart sounds.   Pulmonary:      Effort: Pulmonary effort is normal.      Breath sounds: Normal breath sounds.   Musculoskeletal:      Right shoulder: No swelling or tenderness. Normal range of motion.      Left shoulder: No swelling or tenderness. Normal range of motion.      Right wrist: No swelling or tenderness. Normal range of motion.      Left wrist: No swelling or tenderness. Normal range of motion.      Right hand: Swelling and tenderness present. Normal range of motion.      Left hand: Tenderness present. No swelling. Decreased range of motion.      Right knee: No swelling. Normal range of motion. No tenderness.      Left knee: No swelling. Normal range of motion. No tenderness.      Right ankle: No swelling. No tenderness. Normal range of motion.      Left ankle: No swelling. No tenderness. Normal range of motion.      Right foot: Normal range of motion. No swelling or tenderness.      Left foot: Normal range of motion. No swelling or tenderness.      Comments: ruight rheum nodule on elbow. No flexion contracture. Right 2,3 MCP with swelling.    Skin:     General: Skin is warm and dry.   Neurological:      Mental Status: He is oriented to person, place, and time.   Psychiatric:         Speech: Speech normal.         Behavior: Behavior normal.               Assessment:       Encounter Diagnoses   Name Primary?    Rheumatoid arthritis involving multiple sites with positive rheumatoid factor Yes    Immunosuppression     High risk medications (not anticoagulants) long-term use             Plan:        Rheumatoid arthritis involving multiple sites with positive rheumatoid factor  -      Cancel: ALT (SGPT); Future; Expected date: 06/11/2025  -     Cancel: AST (SGOT); Future; Expected date: 06/11/2025  -     Cancel: CBC Auto Differential; Future; Expected date: 06/11/2025  -     Cancel: C-Reactive Protein; Future; Expected date: 06/11/2025  -     Cancel: Creatinine, Serum; Future; Expected date: 06/11/2025  -     Cancel: Sedimentation rate; Future; Expected date: 06/11/2025  -     ALT (SGPT); Future; Expected date: 06/11/2025  -     AST (SGOT); Future; Expected date: 06/11/2025  -     C-Reactive Protein; Future; Expected date: 06/11/2025  -     CBC Auto Differential; Future; Expected date: 06/11/2025  -     Creatinine, Serum; Future; Expected date: 06/11/2025  -     Sedimentation rate; Future; Expected date: 06/11/2025    Immunosuppression  -     Cancel: ALT (SGPT); Future; Expected date: 06/11/2025  -     Cancel: AST (SGOT); Future; Expected date: 06/11/2025  -     Cancel: CBC Auto Differential; Future; Expected date: 06/11/2025  -     Cancel: C-Reactive Protein; Future; Expected date: 06/11/2025  -     Cancel: Creatinine, Serum; Future; Expected date: 06/11/2025  -     Cancel: Sedimentation rate; Future; Expected date: 06/11/2025  -     ALT (SGPT); Future; Expected date: 06/11/2025  -     AST (SGOT); Future; Expected date: 06/11/2025  -     C-Reactive Protein; Future; Expected date: 06/11/2025  -     CBC Auto Differential; Future; Expected date: 06/11/2025  -     Creatinine, Serum; Future; Expected date: 06/11/2025  -     Sedimentation rate; Future; Expected date: 06/11/2025    High risk medications (not anticoagulants) long-term use  -     Cancel: ALT (SGPT); Future; Expected date: 06/11/2025  -     Cancel: AST (SGOT); Future; Expected date: 06/11/2025  -     Cancel: CBC Auto Differential; Future; Expected date: 06/11/2025  -     Cancel: C-Reactive Protein; Future; Expected date: 06/11/2025  -     Cancel: Creatinine, Serum; Future; Expected date: 06/11/2025  -     Cancel: Sedimentation rate;  Future; Expected date: 06/11/2025  -     ALT (SGPT); Future; Expected date: 06/11/2025  -     AST (SGOT); Future; Expected date: 06/11/2025  -     C-Reactive Protein; Future; Expected date: 06/11/2025  -     CBC Auto Differential; Future; Expected date: 06/11/2025  -     Creatinine, Serum; Future; Expected date: 06/11/2025  -     Sedimentation rate; Future; Expected date: 06/11/2025        Continue MTX at 9 tabs weekly  Folic acid 1mg daily.   HCQ 200mg once daily off for >1 year with no flare.     CAD with CHF: stable overall no new events follows with cardiology.     New pulm nodule: pending 2nd bx for better characterization. : hx of Rheum nodule on his elbow, no granulomatous changes noted on initial path.     Follow up in about 4 months (around 10/11/2025).        30min consultation with greater than 50% spent in counseling, chart review and coordination of care. All questions answered.    Thank you for allowing me to participate in the care of this very pleasant patient.      Notes from Shereen Cronin 10/16/2024 patient is here for new diagnosis adenocarcinoma of the lung hoping that this will be surgical only with no radiation and chemo but as per above he is waiting to meet with another surgeon   Given the increase in size of pulmonary nodule in LLL, decision was made to proceed with repeat robotic bronchoscopy with biopsy of nodule.  Will refer to Dr. Tang at [facility] for repeat biopsy.  Initial biopsy on 06/13/2024 with results negative for malignancy and only revealing fibrosis with fibrinous exudate.